# Patient Record
Sex: FEMALE | Race: WHITE | Employment: FULL TIME | URBAN - METROPOLITAN AREA
[De-identification: names, ages, dates, MRNs, and addresses within clinical notes are randomized per-mention and may not be internally consistent; named-entity substitution may affect disease eponyms.]

---

## 2017-08-02 ENCOUNTER — OFFICE VISIT (OUTPATIENT)
Dept: INTERNAL MEDICINE CLINIC | Facility: CLINIC | Age: 32
End: 2017-08-02

## 2017-08-02 VITALS
BODY MASS INDEX: 29.63 KG/M2 | WEIGHT: 188.75 LBS | HEART RATE: 77 BPM | HEIGHT: 67 IN | DIASTOLIC BLOOD PRESSURE: 76 MMHG | OXYGEN SATURATION: 99 % | TEMPERATURE: 98 F | SYSTOLIC BLOOD PRESSURE: 116 MMHG | RESPIRATION RATE: 16 BRPM

## 2017-08-02 DIAGNOSIS — H92.03 OTALGIA, BILATERAL: ICD-10-CM

## 2017-08-02 DIAGNOSIS — H61.21 RIGHT EAR IMPACTED CERUMEN: ICD-10-CM

## 2017-08-02 DIAGNOSIS — J02.9 PHARYNGITIS, UNSPECIFIED ETIOLOGY: Primary | ICD-10-CM

## 2017-08-02 LAB
CONTROL LINE PRESENT WITH A CLEAR BACKGROUND (YES/NO): YES YES/NO
KIT LOT #: NORMAL NUMERIC

## 2017-08-02 PROCEDURE — 69209 REMOVE IMPACTED EAR WAX UNI: CPT | Performed by: PHYSICIAN ASSISTANT

## 2017-08-02 PROCEDURE — 87880 STREP A ASSAY W/OPTIC: CPT | Performed by: PHYSICIAN ASSISTANT

## 2017-08-02 PROCEDURE — 99213 OFFICE O/P EST LOW 20 MIN: CPT | Performed by: PHYSICIAN ASSISTANT

## 2017-08-02 NOTE — PROGRESS NOTES
HPI:  Almaz Pedro is a 28year old female who presents for URI symptoms x 1 day. C/o sore throat, ear pain, nasal congestion, sinus pressure, fatigue. Denies fever, chills, sweats, cough, SOB, or GI symptoms.   Had a rash on the top of her R foot a couple mucosa inflamed  NECK: supple, no lymphadenopathy  LUNGS: clear to auscultation, no wheezing or rales  CARDIO: RRR, no murmur  GI: soft, non-tender, non-distended, BS present, no tenderness, no hepatosplenomegaly    ASSESSMENT AND PLAN:  # Pharyngitis: Dis

## 2017-08-02 NOTE — PATIENT INSTRUCTIONS
Sore Throat:  - may take tylenol (acetaminophen) 1,000 mg every 8 hours as needed (do not exceed 3,000 mg daily)  - may take ibuprofen 600 mg every 8 hours WITH food as needed  - salt water gargles, throat lozenges, chloraseptic spray as needed    Ear Pres

## 2017-08-12 ENCOUNTER — HOSPITAL ENCOUNTER (OUTPATIENT)
Age: 32
Discharge: HOME | End: 2017-08-12
Payer: COMMERCIAL

## 2017-08-12 VITALS
TEMPERATURE: 98 F | SYSTOLIC BLOOD PRESSURE: 129 MMHG | RESPIRATION RATE: 18 BRPM | OXYGEN SATURATION: 100 % | DIASTOLIC BLOOD PRESSURE: 77 MMHG | WEIGHT: 185 LBS | HEART RATE: 80 BPM | BODY MASS INDEX: 29.03 KG/M2 | HEIGHT: 67 IN

## 2017-08-12 DIAGNOSIS — L72.3 INFECTED SEBACEOUS CYST OF SKIN: Primary | ICD-10-CM

## 2017-08-12 DIAGNOSIS — L72.3 SEBACEOUS CYST OF EAR: ICD-10-CM

## 2017-08-12 DIAGNOSIS — L08.9 INFECTED SEBACEOUS CYST OF SKIN: Primary | ICD-10-CM

## 2017-08-12 PROCEDURE — 99204 OFFICE O/P NEW MOD 45 MIN: CPT

## 2017-08-12 PROCEDURE — 99213 OFFICE O/P EST LOW 20 MIN: CPT

## 2017-08-12 RX ORDER — CLINDAMYCIN HYDROCHLORIDE 300 MG/1
300 CAPSULE ORAL 3 TIMES DAILY
Qty: 30 CAPSULE | Refills: 0 | Status: SHIPPED | OUTPATIENT
Start: 2017-08-12 | End: 2017-08-22

## 2017-08-12 NOTE — ED PROVIDER NOTES
Patient Seen in: THE [facility]    History   Patient presents with:  Bump    Stated Complaint: CYST BEHIND EAR     HPI    Mary is a 27-year-old female who comes in stating that she had a bump behind her left ear for approximately 2 w Current:/77   Pulse 80   Temp 98.1 °F (36.7 °C) (Temporal)   Resp 18   Ht 170.2 cm (5' 7\")   Wt 83.9 kg   SpO2 100%   BMI 28.98 kg/m²         Physical Exam   Constitutional: She is oriented to person, place, and time.  She appears well-developed and ============================================================  ED Course  ------------------------------------------------------------  MDM   Patient has 1.5 cm indurated what I believe is a infected sebaceous cyst the area is extremely indurated and not Ra

## 2017-09-14 ENCOUNTER — TELEPHONE (OUTPATIENT)
Dept: INTERNAL MEDICINE CLINIC | Facility: CLINIC | Age: 32
End: 2017-09-14

## 2018-01-12 ENCOUNTER — LAB ENCOUNTER (OUTPATIENT)
Dept: LAB | Age: 33
End: 2018-01-12
Attending: NURSE PRACTITIONER
Payer: COMMERCIAL

## 2018-01-12 DIAGNOSIS — Z01.419 PAP SMEAR, LOW-RISK: Primary | ICD-10-CM

## 2018-01-12 PROCEDURE — 88175 CYTOPATH C/V AUTO FLUID REDO: CPT

## 2018-01-12 PROCEDURE — 87624 HPV HI-RISK TYP POOLED RSLT: CPT

## 2018-01-13 ENCOUNTER — HOSPITAL ENCOUNTER (OUTPATIENT)
Age: 33
Discharge: HOME | End: 2018-01-13
Attending: PEDIATRICS
Payer: COMMERCIAL

## 2018-01-13 VITALS
DIASTOLIC BLOOD PRESSURE: 84 MMHG | BODY MASS INDEX: 30.53 KG/M2 | TEMPERATURE: 99 F | HEIGHT: 66 IN | OXYGEN SATURATION: 100 % | RESPIRATION RATE: 16 BRPM | HEART RATE: 95 BPM | WEIGHT: 190 LBS | SYSTOLIC BLOOD PRESSURE: 132 MMHG

## 2018-01-13 DIAGNOSIS — J06.9 UPPER RESPIRATORY TRACT INFECTION, UNSPECIFIED TYPE: Primary | ICD-10-CM

## 2018-01-13 LAB — S PYO AG THROAT QL: NEGATIVE

## 2018-01-13 PROCEDURE — 87430 STREP A AG IA: CPT

## 2018-01-13 PROCEDURE — 99213 OFFICE O/P EST LOW 20 MIN: CPT

## 2018-01-13 PROCEDURE — 99204 OFFICE O/P NEW MOD 45 MIN: CPT

## 2018-01-13 RX ORDER — CODEINE PHOSPHATE AND GUAIFENESIN 10; 100 MG/5ML; MG/5ML
5 SOLUTION ORAL EVERY 6 HOURS PRN
Qty: 120 ML | Refills: 0 | Status: SHIPPED | OUTPATIENT
Start: 2018-01-13 | End: 2019-03-07

## 2018-01-13 NOTE — ED INITIAL ASSESSMENT (HPI)
Cold symptoms for a week. Fever and cough started last night of 100. Patient has been taking tylenol and dayquil. Patient has headaches. Denies CP and SOB.

## 2018-01-13 NOTE — ED PROVIDER NOTES
Patient presents with:  Fever (infectious)  Cough/URI  Sore Throat      HPI:     Estella Lubin is a 35year old female who presents for evaluation of a chief complaint of upper respiratory symptoms for about 4 days.   Patient states this started with a headac Supportive care    All results reviewed and discussed with patient. See AVS for detailed discharge instructions for your condition today.     Follow Up with:  Elias Manriquez MD  [address] [facility]  [phone]      As needed

## 2018-01-15 LAB — HPV I/H RISK 1 DNA SPEC QL NAA+PROBE: NEGATIVE

## 2018-01-16 ENCOUNTER — OFFICE VISIT (OUTPATIENT)
Dept: INTERNAL MEDICINE CLINIC | Facility: CLINIC | Age: 33
End: 2018-01-16

## 2018-01-16 VITALS
WEIGHT: 194 LBS | RESPIRATION RATE: 16 BRPM | BODY MASS INDEX: 31 KG/M2 | SYSTOLIC BLOOD PRESSURE: 110 MMHG | DIASTOLIC BLOOD PRESSURE: 70 MMHG | OXYGEN SATURATION: 99 % | TEMPERATURE: 99 F | HEART RATE: 77 BPM

## 2018-01-16 DIAGNOSIS — J01.10 ACUTE NON-RECURRENT FRONTAL SINUSITIS: Primary | ICD-10-CM

## 2018-01-16 PROCEDURE — 99213 OFFICE O/P EST LOW 20 MIN: CPT | Performed by: NURSE PRACTITIONER

## 2018-01-16 RX ORDER — BENZONATATE 200 MG/1
200 CAPSULE ORAL 3 TIMES DAILY PRN
Qty: 30 CAPSULE | Refills: 0 | Status: SHIPPED | OUTPATIENT
Start: 2018-01-16 | End: 2019-03-07

## 2018-01-16 RX ORDER — LEVOFLOXACIN 500 MG/1
500 TABLET, FILM COATED ORAL DAILY
Qty: 10 TABLET | Refills: 0 | Status: SHIPPED | OUTPATIENT
Start: 2018-01-16 | End: 2019-03-07

## 2018-01-16 NOTE — PROGRESS NOTES
CHIEF COMPLAINT:   Patient presents with:  Urgent Care F/u: 1/13/18 dt URI. Using Severe Cough Mucinex and alternating Tylenol and Ibuprofen.       HPI:   Isela Goldberg is a 35year old female who presents for a recheck after Urgent Care visit for diagnosis wheezing, See HPI  CARDIOVASCULAR: denies chest pain or palpitations   GI: denies N/V/C or abdominal pain  NEURO: Denies headaches    EXAM:   /70 (BP Location: Left arm, Patient Position: Sitting, Cuff Size: large)   Pulse 77   Temp 98.7 °F (37.1 °C)

## 2018-01-17 LAB — LAST PAP RESULT: NORMAL

## 2018-01-23 ENCOUNTER — TELEPHONE (OUTPATIENT)
Dept: INTERNAL MEDICINE CLINIC | Facility: CLINIC | Age: 33
End: 2018-01-23

## 2018-06-03 ENCOUNTER — HOSPITAL ENCOUNTER (OUTPATIENT)
Age: 33
Discharge: HOME | End: 2018-06-03
Attending: PEDIATRICS
Payer: COMMERCIAL

## 2018-06-03 VITALS
OXYGEN SATURATION: 100 % | BODY MASS INDEX: 31.34 KG/M2 | DIASTOLIC BLOOD PRESSURE: 78 MMHG | SYSTOLIC BLOOD PRESSURE: 113 MMHG | HEART RATE: 81 BPM | RESPIRATION RATE: 18 BRPM | HEIGHT: 66 IN | TEMPERATURE: 99 F | WEIGHT: 195 LBS
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
## 2018-06-03 PROCEDURE — 99212 OFFICE O/P EST SF 10 MIN: CPT

## 2018-06-03 NOTE — ED PROVIDER NOTES
Patient presents with:  Bite Sting,Insect (integumentary)    Stated Complaint: bug bite 8 weeks pregnant    HPI  Patient complains of bug bite for  3 days. Located R lower back. Describes as minimally itchy, not bothersome.  Possible exposures include: Ht 167.6 cm (5' 6\")   Wt 88.5 kg   LMP 01/01/2018   SpO2 100%   BMI 31.47 kg/m²       GENERAL: nad  HEENT: EOMI, MMM no lesions  EXTREMITIES: no edema  NEURO: alert, oriented x 3, 2-12 intact, no focal deficits appreciated  SKIN:  Small 0.5mm urticarial

## 2020-01-23 ENCOUNTER — OFFICE VISIT (OUTPATIENT)
Dept: INTERNAL MEDICINE CLINIC | Facility: CLINIC | Age: 35
End: 2020-01-23
Payer: COMMERCIAL

## 2020-01-23 VITALS
BODY MASS INDEX: 28.98 KG/M2 | OXYGEN SATURATION: 99 % | WEIGHT: 182.5 LBS | HEART RATE: 94 BPM | RESPIRATION RATE: 16 BRPM | TEMPERATURE: 98 F | SYSTOLIC BLOOD PRESSURE: 118 MMHG | DIASTOLIC BLOOD PRESSURE: 68 MMHG | HEIGHT: 66.5 IN

## 2020-01-23 DIAGNOSIS — J06.9 VIRAL UPPER RESPIRATORY TRACT INFECTION: Primary | ICD-10-CM

## 2020-01-23 DIAGNOSIS — R82.998 SWEET URINE ODOR: ICD-10-CM

## 2020-01-23 DIAGNOSIS — Z00.00 LABORATORY EXAMINATION ORDERED AS PART OF A COMPLETE PHYSICAL EXAMINATION: ICD-10-CM

## 2020-01-23 DIAGNOSIS — Z83.3 FAMILY HISTORY OF DIABETES MELLITUS (DM): ICD-10-CM

## 2020-01-23 PROCEDURE — 99214 OFFICE O/P EST MOD 30 MIN: CPT | Performed by: NURSE PRACTITIONER

## 2020-01-23 NOTE — PROGRESS NOTES
CHIEF COMPLAINT:   Patient presents with:  Sinus Problem      HPI:   Reina Gomes is a 28year old female who presents for upper respiratory symptoms for  3 days. Patient reports PND, nasal congestion, feeling hot but no fevers. Denies cough.  Symptoms have intact  EARS: TM's yellow, no bulging, no retraction, no fluid, bony landmarks visible  NOSE: Nostrils patent, yellow nasal discharge, nasal mucosa mildly erythematous  THROAT: Oral mucosa pink, moist. Posterior pharynx is not erythematous. + PND exudates.

## 2020-01-23 NOTE — PATIENT INSTRUCTIONS
Viral Upper Respiratory Illness (Adult)    You have a viral upper respiratory illness (URI), which is another term for the common cold. This illness is contagious during the first few days. It is spread through the air by coughing and sneezing.  It may al · Over-the-counter cold medicines will not shorten the length of time you’re sick, but they may be helpful for the following symptoms: cough, sore throat, and nasal and sinus congestion.  If you take prescription medicines, ask your healthcare provider or p An inflamed and irritated stomach lining is more likely to develop a sore called an ulcer. To help prevent this, gastritis should be treated. Home care  If needed, our healthcare provider may prescribe medicines.  If you have H pylori infection, treating i

## 2020-09-23 ENCOUNTER — APPOINTMENT (OUTPATIENT)
Dept: LAB | Age: 35
End: 2020-09-23
Attending: FAMILY MEDICINE
Payer: COMMERCIAL

## 2020-09-23 ENCOUNTER — TELEPHONE (OUTPATIENT)
Dept: INTERNAL MEDICINE CLINIC | Facility: CLINIC | Age: 35
End: 2020-09-23

## 2020-09-23 DIAGNOSIS — R19.7 DIARRHEA, UNSPECIFIED TYPE: ICD-10-CM

## 2020-09-23 DIAGNOSIS — R19.7 DIARRHEA, UNSPECIFIED TYPE: Primary | ICD-10-CM

## 2020-09-23 DIAGNOSIS — R51.9 ACUTE NONINTRACTABLE HEADACHE, UNSPECIFIED HEADACHE TYPE: ICD-10-CM

## 2020-09-23 NOTE — TELEPHONE ENCOUNTER
Spoke with patient notified COVID test ordered, patient will call CS to schedule appt. Patient verbalized understanding and agreeable to POC.

## 2020-09-23 NOTE — TELEPHONE ENCOUNTER
Spoke with patient stating earlier this week started having headache and diarrhea, feels fatigue today no fevers, no chills, no nausea, no vomiting, no abdominal pain, no sore throat, no CP, no SOB or any other symptom at this time.  Patient was not exposed

## 2020-09-23 NOTE — TELEPHONE ENCOUNTER
Pt would like a to request an order for Covid says she has diarrhea and headache and her job would like her to get tested

## 2020-09-26 LAB — SARS-COV-2 RNA RESP QL NAA+PROBE: NOT DETECTED

## 2021-04-13 ENCOUNTER — TELEPHONE (OUTPATIENT)
Dept: INTERNAL MEDICINE CLINIC | Facility: CLINIC | Age: 36
End: 2021-04-13

## 2021-04-13 NOTE — TELEPHONE ENCOUNTER
Patient made an appt via [email or web address] for a CPX with Dr. Roddy Peters but she has not been seen since 2015? Ok to keep?

## 2021-04-23 ENCOUNTER — OFFICE VISIT (OUTPATIENT)
Dept: INTERNAL MEDICINE CLINIC | Facility: CLINIC | Age: 36
End: 2021-04-23
Payer: COMMERCIAL

## 2021-04-23 VITALS
RESPIRATION RATE: 12 BRPM | DIASTOLIC BLOOD PRESSURE: 48 MMHG | HEIGHT: 66.75 IN | TEMPERATURE: 99 F | SYSTOLIC BLOOD PRESSURE: 107 MMHG | OXYGEN SATURATION: 100 % | HEART RATE: 78 BPM | WEIGHT: 193.5 LBS | BODY MASS INDEX: 30.37 KG/M2

## 2021-04-23 DIAGNOSIS — Z00.00 WELLNESS EXAMINATION: Primary | ICD-10-CM

## 2021-04-23 DIAGNOSIS — Z00.00 LABORATORY EXAMINATION ORDERED AS PART OF A COMPLETE PHYSICAL EXAMINATION: ICD-10-CM

## 2021-04-23 PROCEDURE — 3078F DIAST BP <80 MM HG: CPT | Performed by: FAMILY MEDICINE

## 2021-04-23 PROCEDURE — 3074F SYST BP LT 130 MM HG: CPT | Performed by: FAMILY MEDICINE

## 2021-04-23 PROCEDURE — 99395 PREV VISIT EST AGE 18-39: CPT | Performed by: FAMILY MEDICINE

## 2021-04-23 PROCEDURE — 3008F BODY MASS INDEX DOCD: CPT | Performed by: FAMILY MEDICINE

## 2021-04-23 NOTE — PROGRESS NOTES
HPI:   Severo Mina is a 39year old female who presents for a complete physical exam.     Wt Readings from Last 6 Encounters:  04/23/21 : 193 lb 8 oz (87.8 kg)  01/23/20 : 182 lb 8 oz (82.8 kg)  03/07/19 : 179 lb (81.2 kg)  06/03/18 : 195 lb (88.5 kg)  01/ Ht 5' 6.75\" (1.695 m)   Wt 193 lb 8 oz (87.8 kg)   LMP 03/20/2021 (Approximate)   SpO2 100%   Breastfeeding No   BMI 30.53 kg/m²   Body mass index is 30.53 kg/m².    GENERAL: well developed, well nourished,in no apparent distress  SKIN: no rashes   NECK: s

## 2021-04-27 ENCOUNTER — LABORATORY ENCOUNTER (OUTPATIENT)
Dept: LAB | Age: 36
End: 2021-04-27
Attending: FAMILY MEDICINE
Payer: COMMERCIAL

## 2021-04-27 ENCOUNTER — PATIENT MESSAGE (OUTPATIENT)
Dept: INTERNAL MEDICINE CLINIC | Facility: CLINIC | Age: 36
End: 2021-04-27

## 2021-04-27 DIAGNOSIS — R31.9 HEMATURIA, UNSPECIFIED TYPE: ICD-10-CM

## 2021-04-27 DIAGNOSIS — E55.9 VITAMIN D DEFICIENCY: Primary | ICD-10-CM

## 2021-04-27 DIAGNOSIS — Z00.00 LABORATORY EXAMINATION ORDERED AS PART OF A COMPLETE PHYSICAL EXAMINATION: ICD-10-CM

## 2021-04-27 DIAGNOSIS — Z00.00 WELLNESS EXAMINATION: ICD-10-CM

## 2021-04-27 DIAGNOSIS — D64.9 ANEMIA, UNSPECIFIED TYPE: ICD-10-CM

## 2021-04-27 PROCEDURE — 80050 GENERAL HEALTH PANEL: CPT | Performed by: FAMILY MEDICINE

## 2021-04-27 PROCEDURE — 82306 VITAMIN D 25 HYDROXY: CPT | Performed by: FAMILY MEDICINE

## 2021-04-27 PROCEDURE — 81001 URINALYSIS AUTO W/SCOPE: CPT | Performed by: FAMILY MEDICINE

## 2021-04-27 PROCEDURE — 80061 LIPID PANEL: CPT | Performed by: FAMILY MEDICINE

## 2021-04-28 RX ORDER — ERGOCALCIFEROL 1.25 MG/1
50000 CAPSULE ORAL WEEKLY
Qty: 4 CAPSULE | Refills: 5 | Status: SHIPPED | OUTPATIENT
Start: 2021-04-28 | End: 2021-10-03 | Stop reason: ALTCHOICE

## 2021-04-28 NOTE — TELEPHONE ENCOUNTER
Samantha Hicks MD   4/27/2021  7:35 PM CDT Back to Top      UA looks like UTI  any s/s?   Lipids OK  thryoid OK  Low Vit D   rec vit D 40728E weekly #6 5r  luisito 6 mo  Very slight anemia   suspect d/t menses    rec daily MVI w iron     Luisito CBC in 3 mo

## 2021-04-28 NOTE — TELEPHONE ENCOUNTER
From: Chani Dear  To: Lucy Forbes MD  Sent: 4/27/2021 4:14 PM CDT  Subject: Test Results Question    Hi! I just had a physical last week with Dr. Torres Billings, and I received my test results today from my blood/urinalysis tests in [facility].  I just wanted to abram

## 2021-05-04 ENCOUNTER — LAB ENCOUNTER (OUTPATIENT)
Dept: LAB | Age: 36
End: 2021-05-04
Attending: FAMILY MEDICINE
Payer: COMMERCIAL

## 2021-05-04 DIAGNOSIS — R31.9 HEMATURIA, UNSPECIFIED TYPE: ICD-10-CM

## 2021-05-04 PROCEDURE — 87086 URINE CULTURE/COLONY COUNT: CPT | Performed by: FAMILY MEDICINE

## 2021-05-04 PROCEDURE — 87186 SC STD MICRODIL/AGAR DIL: CPT | Performed by: FAMILY MEDICINE

## 2021-05-04 PROCEDURE — 81001 URINALYSIS AUTO W/SCOPE: CPT | Performed by: FAMILY MEDICINE

## 2021-05-04 PROCEDURE — 87088 URINE BACTERIA CULTURE: CPT | Performed by: FAMILY MEDICINE

## 2021-05-07 ENCOUNTER — TELEPHONE (OUTPATIENT)
Dept: INTERNAL MEDICINE CLINIC | Facility: CLINIC | Age: 36
End: 2021-05-07

## 2021-05-07 RX ORDER — NITROFURANTOIN 25; 75 MG/1; MG/1
100 CAPSULE ORAL 2 TIMES DAILY
Qty: 14 CAPSULE | Refills: 0 | Status: SHIPPED | OUTPATIENT
Start: 2021-05-07 | End: 2021-05-14

## 2021-05-07 NOTE — TELEPHONE ENCOUNTER
----- Message from Yas Sullivan MD sent at 5/7/2021 11:02 AM CDT -----  +UTI   rec macrobid BID for 7 days  #14

## 2021-05-09 ENCOUNTER — HOSPITAL ENCOUNTER (OUTPATIENT)
Age: 36
Discharge: HOME | End: 2021-05-09
Attending: EMERGENCY MEDICINE
Payer: COMMERCIAL

## 2021-05-09 VITALS
DIASTOLIC BLOOD PRESSURE: 84 MMHG | HEART RATE: 82 BPM | BODY MASS INDEX: 30.53 KG/M2 | WEIGHT: 190 LBS | SYSTOLIC BLOOD PRESSURE: 109 MMHG | HEIGHT: 66 IN | RESPIRATION RATE: 20 BRPM | OXYGEN SATURATION: 100 % | TEMPERATURE: 98 F

## 2021-05-09 DIAGNOSIS — N30.00 ACUTE CYSTITIS WITHOUT HEMATURIA: ICD-10-CM

## 2021-05-09 DIAGNOSIS — L73.9 FOLLICULITIS: Primary | ICD-10-CM

## 2021-05-09 DIAGNOSIS — H00.033 EYELID CELLULITIS, RIGHT: ICD-10-CM

## 2021-05-09 PROCEDURE — 10060 I&D ABSCESS SIMPLE/SINGLE: CPT

## 2021-05-09 PROCEDURE — 99213 OFFICE O/P EST LOW 20 MIN: CPT

## 2021-05-09 PROCEDURE — 99203 OFFICE O/P NEW LOW 30 MIN: CPT

## 2021-05-09 PROCEDURE — 089NXZZ DRAINAGE OF RIGHT UPPER EYELID, EXTERNAL APPROACH: ICD-10-PCS | Performed by: EMERGENCY MEDICINE

## 2021-05-09 RX ORDER — CEPHALEXIN 500 MG/1
500 CAPSULE ORAL 4 TIMES DAILY
Qty: 28 CAPSULE | Refills: 0 | Status: SHIPPED | OUTPATIENT
Start: 2021-05-09 | End: 2021-05-16

## 2021-05-09 NOTE — ED PROVIDER NOTES
Patient Seen in: [facility]      History   Patient presents with:  Wound Care: Not sure why,  but my eye is painful and very puffy since yesterday abs is worse today. Can't open all the way.   Just started a uti antibiotic that makes me Temporal   SpO2 100 %   O2 Device None (Room air)       Current:/84   Pulse 82   Temp 98.3 °F (36.8 °C) (Temporal)   Resp 20   Ht 167.6 cm (5' 6\")   Wt 86.2 kg   LMP 04/30/2021 (Approximate)   SpO2 100%   BMI 30.67 kg/m²     Right Eye Chart Acuity: R-0

## 2021-05-09 NOTE — ED INITIAL ASSESSMENT (HPI)
C/o right eye painful, eyelid red, swollen since yesterday and is worse today. Unable to open all the way. Currently on antibiotic for UTI and having nausea for 3 days.

## 2021-10-03 ENCOUNTER — HOSPITAL ENCOUNTER (OUTPATIENT)
Age: 36
Discharge: HOME | End: 2021-10-03
Payer: COMMERCIAL

## 2021-10-03 VITALS
RESPIRATION RATE: 18 BRPM | WEIGHT: 190 LBS | DIASTOLIC BLOOD PRESSURE: 73 MMHG | BODY MASS INDEX: 29.82 KG/M2 | HEART RATE: 74 BPM | HEIGHT: 67 IN | OXYGEN SATURATION: 100 % | TEMPERATURE: 97 F | SYSTOLIC BLOOD PRESSURE: 118 MMHG

## 2021-10-03 DIAGNOSIS — H18.891 CORNEAL IRRITATION OF RIGHT EYE: Primary | ICD-10-CM

## 2021-10-03 PROCEDURE — 90471 IMMUNIZATION ADMIN: CPT

## 2021-10-03 PROCEDURE — 99213 OFFICE O/P EST LOW 20 MIN: CPT

## 2021-10-03 RX ORDER — TOBRAMYCIN 3 MG/ML
2 SOLUTION/ DROPS OPHTHALMIC EVERY 6 HOURS
Qty: 5 ML | Refills: 0 | Status: SHIPPED | OUTPATIENT
Start: 2021-10-03 | End: 2021-10-10

## 2021-10-03 NOTE — ED PROVIDER NOTES
Patient Seen in: [facility] [place]      History   Patient presents with:  Eye Problem    Stated Complaint: Eye Irritation    Subjective:   HPI  Patient is 60-year-old female without significant medical history presents with right eye discomfort not ill-appearing, toxic-appearing or diaphoretic. HENT:      Mouth/Throat:      Mouth: Mucous membranes are moist.   Eyes:      General: No scleral icterus. Right eye: Discharge (Watery) present. Left eye: No discharge.       Extraocular M

## 2021-10-03 NOTE — ED INITIAL ASSESSMENT (HPI)
Pt presents today with c/o right eye pain that started in the middle of the night. Pt denies any injury to the eye. Pt does not wear contact lenses.

## 2022-01-22 ENCOUNTER — TELEPHONE (OUTPATIENT)
Dept: INTERNAL MEDICINE CLINIC | Facility: CLINIC | Age: 37
End: 2022-01-22

## 2022-01-22 NOTE — TELEPHONE ENCOUNTER
Incoming fax from [facility]    Patients H&P from visit on 1/21/2022    Placed in  in basket for review

## 2022-01-25 ENCOUNTER — TELEPHONE (OUTPATIENT)
Dept: INTERNAL MEDICINE CLINIC | Facility: CLINIC | Age: 37
End: 2022-01-25

## 2022-01-25 LAB — PREGNANCY TEST, URINE: NEGATIVE

## 2022-01-27 ENCOUNTER — TELEPHONE (OUTPATIENT)
Dept: INTERNAL MEDICINE CLINIC | Facility: CLINIC | Age: 37
End: 2022-01-27

## 2022-01-27 NOTE — TELEPHONE ENCOUNTER
Incoming fax from [facility]    Patients Operative report from 1/21/2022    Patient had Excision of vulvar mass     Placed in  in basket for review

## 2022-04-13 ENCOUNTER — TELEMEDICINE (OUTPATIENT)
Dept: INTERNAL MEDICINE CLINIC | Facility: CLINIC | Age: 37
End: 2022-04-13
Payer: COMMERCIAL

## 2022-04-13 DIAGNOSIS — J01.90 ACUTE NON-RECURRENT SINUSITIS, UNSPECIFIED LOCATION: Primary | ICD-10-CM

## 2022-04-13 PROCEDURE — 99213 OFFICE O/P EST LOW 20 MIN: CPT | Performed by: FAMILY MEDICINE

## 2022-04-13 RX ORDER — DOXYCYCLINE HYCLATE 100 MG/1
100 CAPSULE ORAL 2 TIMES DAILY
Qty: 20 CAPSULE | Refills: 0 | Status: SHIPPED | OUTPATIENT
Start: 2022-04-13

## 2022-07-24 ENCOUNTER — PATIENT MESSAGE (OUTPATIENT)
Dept: INTERNAL MEDICINE CLINIC | Facility: CLINIC | Age: 37
End: 2022-07-24

## 2022-07-25 ENCOUNTER — OFFICE VISIT (OUTPATIENT)
Dept: INTERNAL MEDICINE CLINIC | Facility: CLINIC | Age: 37
End: 2022-07-25
Payer: COMMERCIAL

## 2022-07-25 ENCOUNTER — TELEPHONE (OUTPATIENT)
Dept: INTERNAL MEDICINE CLINIC | Facility: CLINIC | Age: 37
End: 2022-07-25

## 2022-07-25 ENCOUNTER — HOSPITAL ENCOUNTER (OUTPATIENT)
Dept: GENERAL RADIOLOGY | Age: 37
Discharge: HOME | End: 2022-07-25
Attending: FAMILY MEDICINE
Payer: COMMERCIAL

## 2022-07-25 VITALS
BODY MASS INDEX: 30.95 KG/M2 | WEIGHT: 197.19 LBS | DIASTOLIC BLOOD PRESSURE: 80 MMHG | SYSTOLIC BLOOD PRESSURE: 120 MMHG | OXYGEN SATURATION: 98 % | HEART RATE: 86 BPM | HEIGHT: 67 IN | TEMPERATURE: 99 F

## 2022-07-25 DIAGNOSIS — H66.92 ACUTE LEFT OTITIS MEDIA: ICD-10-CM

## 2022-07-25 DIAGNOSIS — M79.674 PAIN OF TOE OF RIGHT FOOT: ICD-10-CM

## 2022-07-25 DIAGNOSIS — M79.674 PAIN OF TOE OF RIGHT FOOT: Primary | ICD-10-CM

## 2022-07-25 PROCEDURE — 3074F SYST BP LT 130 MM HG: CPT | Performed by: FAMILY MEDICINE

## 2022-07-25 PROCEDURE — 3079F DIAST BP 80-89 MM HG: CPT | Performed by: FAMILY MEDICINE

## 2022-07-25 PROCEDURE — 99213 OFFICE O/P EST LOW 20 MIN: CPT | Performed by: FAMILY MEDICINE

## 2022-07-25 PROCEDURE — 3008F BODY MASS INDEX DOCD: CPT | Performed by: FAMILY MEDICINE

## 2022-07-25 PROCEDURE — 73660 X-RAY EXAM OF TOE(S): CPT | Performed by: FAMILY MEDICINE

## 2022-07-25 RX ORDER — CEFDINIR 300 MG/1
300 CAPSULE ORAL 2 TIMES DAILY
Qty: 20 CAPSULE | Refills: 0 | Status: SHIPPED | OUTPATIENT
Start: 2022-07-25

## 2022-07-25 NOTE — TELEPHONE ENCOUNTER
Please let Pt know.  Pt to continue to shobha tape her toe to the 4th toe and ice the toe and  take some Ibuprofen or ASA for the pain

## 2022-07-25 NOTE — TELEPHONE ENCOUNTER
From: Curtis Ram  To: JIMMIE Zhang  Sent: 7/24/2022 6:54 PM CDT  Subject: Additional concern at [facility]    Hi-- tomorrow at 930 I am seeing [facility] for my ear. Can I possibly add on her checking out my pinky toe? On Sat., I stubbed it yesterday and just went about my day, but the bruising, swelling and pain are worse today and i can't really walk on that toe. I can't move it without intense pain.

## 2022-12-12 ENCOUNTER — TELEMEDICINE (OUTPATIENT)
Dept: INTERNAL MEDICINE CLINIC | Facility: CLINIC | Age: 37
End: 2022-12-12

## 2022-12-12 DIAGNOSIS — J01.90 ACUTE NON-RECURRENT SINUSITIS, UNSPECIFIED LOCATION: Primary | ICD-10-CM

## 2022-12-12 DIAGNOSIS — R05.1 ACUTE COUGH: ICD-10-CM

## 2022-12-12 RX ORDER — CEFDINIR 300 MG/1
300 CAPSULE ORAL 2 TIMES DAILY
Qty: 20 CAPSULE | Refills: 0 | Status: SHIPPED | OUTPATIENT
Start: 2022-12-12

## 2022-12-16 ENCOUNTER — HOSPITAL ENCOUNTER (OUTPATIENT)
Age: 37
Discharge: HOME | End: 2022-12-16
Payer: COMMERCIAL

## 2022-12-16 VITALS
HEART RATE: 78 BPM | BODY MASS INDEX: 30.53 KG/M2 | SYSTOLIC BLOOD PRESSURE: 140 MMHG | OXYGEN SATURATION: 98 % | RESPIRATION RATE: 18 BRPM | TEMPERATURE: 99 F | DIASTOLIC BLOOD PRESSURE: 84 MMHG | WEIGHT: 190 LBS | HEIGHT: 66 IN

## 2022-12-16 DIAGNOSIS — U07.1 COVID-19: Primary | ICD-10-CM

## 2022-12-16 LAB
POCT INFLUENZA A: NEGATIVE
POCT INFLUENZA B: NEGATIVE
SARS-COV-2 RNA RESP QL NAA+PROBE: DETECTED

## 2022-12-16 PROCEDURE — 99213 OFFICE O/P EST LOW 20 MIN: CPT

## 2022-12-16 PROCEDURE — 87502 INFLUENZA DNA AMP PROBE: CPT | Performed by: NURSE PRACTITIONER

## 2022-12-16 NOTE — ED INITIAL ASSESSMENT (HPI)
Pt aox4. Pt c/o cough, fatigue and hoarse voice started Friday. Pt had a telehealth visit on Monday and given Cefdnir. Pt states cough has become worse and started with headache yesterday. Pt states feels worse.

## 2023-01-13 ENCOUNTER — OFFICE VISIT (OUTPATIENT)
Dept: INTERNAL MEDICINE CLINIC | Facility: CLINIC | Age: 38
End: 2023-01-13
Payer: COMMERCIAL

## 2023-01-13 VITALS
OXYGEN SATURATION: 97 % | HEART RATE: 96 BPM | TEMPERATURE: 99 F | DIASTOLIC BLOOD PRESSURE: 78 MMHG | SYSTOLIC BLOOD PRESSURE: 112 MMHG | BODY MASS INDEX: 31.53 KG/M2 | HEIGHT: 66 IN | WEIGHT: 196.19 LBS

## 2023-01-13 DIAGNOSIS — Z00.00 LABORATORY EXAM ORDERED AS PART OF ROUTINE GENERAL MEDICAL EXAMINATION: ICD-10-CM

## 2023-01-13 DIAGNOSIS — Z00.00 WELLNESS EXAMINATION: Primary | ICD-10-CM

## 2023-01-13 PROCEDURE — 3074F SYST BP LT 130 MM HG: CPT | Performed by: FAMILY MEDICINE

## 2023-01-13 PROCEDURE — 3078F DIAST BP <80 MM HG: CPT | Performed by: FAMILY MEDICINE

## 2023-01-13 PROCEDURE — 3008F BODY MASS INDEX DOCD: CPT | Performed by: FAMILY MEDICINE

## 2023-01-13 PROCEDURE — 99395 PREV VISIT EST AGE 18-39: CPT | Performed by: FAMILY MEDICINE

## 2023-01-13 RX ORDER — FLUTICASONE PROPIONATE 50 MCG
1 SPRAY, SUSPENSION (ML) NASAL 2 TIMES DAILY
COMMUNITY
Start: 2022-09-13

## 2023-02-14 ENCOUNTER — HOSPITAL ENCOUNTER (OUTPATIENT)
Age: 38
Discharge: HOME | End: 2023-02-14
Payer: COMMERCIAL

## 2023-02-14 VITALS
SYSTOLIC BLOOD PRESSURE: 112 MMHG | RESPIRATION RATE: 16 BRPM | OXYGEN SATURATION: 99 % | HEART RATE: 76 BPM | TEMPERATURE: 98 F | DIASTOLIC BLOOD PRESSURE: 66 MMHG

## 2023-02-14 DIAGNOSIS — J40 BRONCHITIS: ICD-10-CM

## 2023-02-14 DIAGNOSIS — H66.92 LEFT OTITIS MEDIA, UNSPECIFIED OTITIS MEDIA TYPE: Primary | ICD-10-CM

## 2023-02-14 PROCEDURE — 99213 OFFICE O/P EST LOW 20 MIN: CPT

## 2023-02-14 RX ORDER — DOXYCYCLINE HYCLATE 100 MG/1
100 CAPSULE ORAL 2 TIMES DAILY
Qty: 14 CAPSULE | Refills: 0 | Status: SHIPPED | OUTPATIENT
Start: 2023-02-14 | End: 2023-02-21

## 2023-02-14 RX ORDER — ALBUTEROL SULFATE 90 UG/1
2 AEROSOL, METERED RESPIRATORY (INHALATION) EVERY 4 HOURS PRN
Qty: 1 EACH | Refills: 0 | Status: SHIPPED | OUTPATIENT
Start: 2023-02-14 | End: 2023-03-16

## 2023-02-14 RX ORDER — PREDNISONE 20 MG/1
40 TABLET ORAL DAILY
Qty: 10 TABLET | Refills: 0 | Status: SHIPPED | OUTPATIENT
Start: 2023-02-14 | End: 2023-02-19

## 2023-02-14 RX ORDER — BENZONATATE 200 MG/1
200 CAPSULE ORAL 3 TIMES DAILY PRN
Qty: 30 CAPSULE | Refills: 0 | Status: SHIPPED | OUTPATIENT
Start: 2023-02-14 | End: 2023-02-24

## 2023-02-15 NOTE — DISCHARGE INSTRUCTIONS
Take medications as directed. Follow-up with your primary care doctor. Tylenol/Motrin as needed for pain. Return with any new or worsening symptoms.

## 2023-02-15 NOTE — ED INITIAL ASSESSMENT (HPI)
x1 wk Pt c/o cough, chest congestion getting worse, fatigue, chills    Denies: recent fevers    Fri Pt went to a [facility] diagnosed with viral illness. +COVID in December.

## 2023-02-16 ENCOUNTER — APPOINTMENT (OUTPATIENT)
Dept: GENERAL RADIOLOGY | Age: 38
End: 2023-02-16
Attending: EMERGENCY MEDICINE
Payer: COMMERCIAL

## 2023-02-16 ENCOUNTER — HOSPITAL ENCOUNTER (OUTPATIENT)
Age: 38
Discharge: HOME | End: 2023-02-16
Attending: EMERGENCY MEDICINE
Payer: COMMERCIAL

## 2023-02-16 ENCOUNTER — TELEPHONE (OUTPATIENT)
Dept: INTERNAL MEDICINE CLINIC | Facility: CLINIC | Age: 38
End: 2023-02-16

## 2023-02-16 VITALS
RESPIRATION RATE: 18 BRPM | HEART RATE: 88 BPM | DIASTOLIC BLOOD PRESSURE: 70 MMHG | SYSTOLIC BLOOD PRESSURE: 135 MMHG | OXYGEN SATURATION: 99 % | TEMPERATURE: 98 F

## 2023-02-16 DIAGNOSIS — J06.9 VIRAL UPPER RESPIRATORY TRACT INFECTION: Primary | ICD-10-CM

## 2023-02-16 LAB — SARS-COV-2 RNA RESP QL NAA+PROBE: NOT DETECTED

## 2023-02-16 PROCEDURE — 71046 X-RAY EXAM CHEST 2 VIEWS: CPT | Performed by: EMERGENCY MEDICINE

## 2023-02-16 PROCEDURE — 99214 OFFICE O/P EST MOD 30 MIN: CPT

## 2023-02-16 NOTE — TELEPHONE ENCOUNTER
Please cancel appt for today. Spoke to patient, she states she was unable to sleep last night. Still feels like someone is sitting on her chest. Since starting the medication prescribed at  she feels worse. She now has ear pain she didn't have before despite being told she had an ear infection. She is taking shallow breaths because she can't take a deep breath without coughing. She overall feels worse. Audible dry cough noted during conversation. Patient was advised to proceed to UC or ER for treatment as she may need further testing that can't be performed in office. Patient agreeable.

## 2023-02-16 NOTE — ED INITIAL ASSESSMENT (HPI)
Patient presents to IC with continued c/o cough and congestion-states not improved from 2 days ago. No fever.

## 2023-02-16 NOTE — DISCHARGE INSTRUCTIONS
Continue to rest, supportive care, multivitamins, lots of fluids.   Follow-up with your primary care doctor as needed

## 2023-03-22 ENCOUNTER — OFFICE VISIT (OUTPATIENT)
Dept: INTERNAL MEDICINE CLINIC | Facility: CLINIC | Age: 38
End: 2023-03-22
Payer: COMMERCIAL

## 2023-03-22 VITALS
WEIGHT: 191.38 LBS | SYSTOLIC BLOOD PRESSURE: 110 MMHG | TEMPERATURE: 99 F | HEIGHT: 66 IN | DIASTOLIC BLOOD PRESSURE: 76 MMHG | HEART RATE: 75 BPM | OXYGEN SATURATION: 99 % | BODY MASS INDEX: 30.76 KG/M2

## 2023-03-22 DIAGNOSIS — K52.9 GASTROENTERITIS: Primary | ICD-10-CM

## 2023-03-22 PROCEDURE — 3078F DIAST BP <80 MM HG: CPT | Performed by: FAMILY MEDICINE

## 2023-03-22 PROCEDURE — 99213 OFFICE O/P EST LOW 20 MIN: CPT | Performed by: FAMILY MEDICINE

## 2023-03-22 PROCEDURE — 3074F SYST BP LT 130 MM HG: CPT | Performed by: FAMILY MEDICINE

## 2023-03-22 PROCEDURE — 3008F BODY MASS INDEX DOCD: CPT | Performed by: FAMILY MEDICINE

## 2023-08-03 ENCOUNTER — LAB ENCOUNTER (OUTPATIENT)
Dept: LAB | Age: 38
End: 2023-08-03
Attending: FAMILY MEDICINE
Payer: COMMERCIAL

## 2023-08-03 DIAGNOSIS — Z00.00 WELLNESS EXAMINATION: ICD-10-CM

## 2023-08-03 DIAGNOSIS — Z00.00 LABORATORY EXAM ORDERED AS PART OF ROUTINE GENERAL MEDICAL EXAMINATION: ICD-10-CM

## 2023-08-03 LAB
ALBUMIN SERPL-MCNC: 3.8 G/DL (ref 3.4–5)
ALBUMIN/GLOB SERPL: 1.1 {RATIO} (ref 1–2)
ALP LIVER SERPL-CCNC: 56 U/L
ALT SERPL-CCNC: 26 U/L
ANION GAP SERPL CALC-SCNC: 4 MMOL/L (ref 0–18)
AST SERPL-CCNC: 17 U/L (ref 15–37)
BASOPHILS # BLD AUTO: 0.04 X10(3) UL (ref 0–0.2)
BASOPHILS NFR BLD AUTO: 0.6 %
BILIRUB SERPL-MCNC: 0.5 MG/DL (ref 0.1–2)
BILIRUB UR QL STRIP.AUTO: NEGATIVE
BUN BLD-MCNC: 9 MG/DL (ref 7–18)
CALCIUM BLD-MCNC: 8.9 MG/DL (ref 8.5–10.1)
CHLORIDE SERPL-SCNC: 108 MMOL/L (ref 98–112)
CHOLEST SERPL-MCNC: 141 MG/DL (ref ?–200)
CLARITY UR REFRACT.AUTO: CLEAR
CO2 SERPL-SCNC: 27 MMOL/L (ref 21–32)
CREAT BLD-MCNC: 0.65 MG/DL
EGFRCR SERPLBLD CKD-EPI 2021: 116 ML/MIN/1.73M2 (ref 60–?)
EOSINOPHIL # BLD AUTO: 0.31 X10(3) UL (ref 0–0.7)
EOSINOPHIL NFR BLD AUTO: 4.5 %
ERYTHROCYTE [DISTWIDTH] IN BLOOD BY AUTOMATED COUNT: 12.5 %
FASTING PATIENT LIPID ANSWER: YES
FASTING STATUS PATIENT QL REPORTED: YES
GLOBULIN PLAS-MCNC: 3.4 G/DL (ref 2.8–4.4)
GLUCOSE BLD-MCNC: 90 MG/DL (ref 70–99)
GLUCOSE UR STRIP.AUTO-MCNC: NEGATIVE MG/DL
HCT VFR BLD AUTO: 41.4 %
HDLC SERPL-MCNC: 38 MG/DL (ref 40–59)
HGB BLD-MCNC: 13 G/DL
IMM GRANULOCYTES # BLD AUTO: 0.02 X10(3) UL (ref 0–1)
IMM GRANULOCYTES NFR BLD: 0.3 %
KETONES UR STRIP.AUTO-MCNC: NEGATIVE MG/DL
LDLC SERPL CALC-MCNC: 79 MG/DL (ref ?–100)
LYMPHOCYTES # BLD AUTO: 1.48 X10(3) UL (ref 1–4)
LYMPHOCYTES NFR BLD AUTO: 21.3 %
MCH RBC QN AUTO: 28.2 PG (ref 26–34)
MCHC RBC AUTO-ENTMCNC: 31.4 G/DL (ref 31–37)
MCV RBC AUTO: 89.8 FL
MONOCYTES # BLD AUTO: 0.54 X10(3) UL (ref 0.1–1)
MONOCYTES NFR BLD AUTO: 7.8 %
NEUTROPHILS # BLD AUTO: 4.57 X10 (3) UL (ref 1.5–7.7)
NEUTROPHILS # BLD AUTO: 4.57 X10(3) UL (ref 1.5–7.7)
NEUTROPHILS NFR BLD AUTO: 65.5 %
NITRITE UR QL STRIP.AUTO: NEGATIVE
NONHDLC SERPL-MCNC: 103 MG/DL (ref ?–130)
OSMOLALITY SERPL CALC.SUM OF ELEC: 286 MOSM/KG (ref 275–295)
PH UR STRIP.AUTO: 7 [PH] (ref 5–8)
PLATELET # BLD AUTO: 270 10(3)UL (ref 150–450)
POTASSIUM SERPL-SCNC: 4 MMOL/L (ref 3.5–5.1)
PROT SERPL-MCNC: 7.2 G/DL (ref 6.4–8.2)
PROT UR STRIP.AUTO-MCNC: NEGATIVE MG/DL
RBC # BLD AUTO: 4.61 X10(6)UL
RBC UR QL AUTO: NEGATIVE
SODIUM SERPL-SCNC: 139 MMOL/L (ref 136–145)
SP GR UR STRIP.AUTO: 1 (ref 1–1.03)
TRIGL SERPL-MCNC: 138 MG/DL (ref 30–149)
TSI SER-ACNC: 0.76 MIU/ML (ref 0.36–3.74)
UROBILINOGEN UR STRIP.AUTO-MCNC: <2 MG/DL
VIT D+METAB SERPL-MCNC: 17.8 NG/ML (ref 30–100)
VLDLC SERPL CALC-MCNC: 22 MG/DL (ref 0–30)
WBC # BLD AUTO: 7 X10(3) UL (ref 4–11)

## 2023-08-03 PROCEDURE — 81001 URINALYSIS AUTO W/SCOPE: CPT | Performed by: FAMILY MEDICINE

## 2023-08-03 PROCEDURE — 80061 LIPID PANEL: CPT | Performed by: FAMILY MEDICINE

## 2023-08-03 PROCEDURE — 80050 GENERAL HEALTH PANEL: CPT | Performed by: FAMILY MEDICINE

## 2023-08-03 PROCEDURE — 82306 VITAMIN D 25 HYDROXY: CPT | Performed by: FAMILY MEDICINE

## 2023-08-07 DIAGNOSIS — E55.9 VITAMIN D DEFICIENCY: Primary | ICD-10-CM

## 2023-08-07 RX ORDER — ERGOCALCIFEROL 1.25 MG/1
50000 CAPSULE ORAL WEEKLY
Qty: 12 CAPSULE | Refills: 1 | Status: SHIPPED | OUTPATIENT
Start: 2023-08-07 | End: 2024-01-16

## 2023-10-25 ENCOUNTER — HOSPITAL ENCOUNTER (OUTPATIENT)
Age: 38
Discharge: HOME | End: 2023-10-25
Payer: COMMERCIAL

## 2023-10-25 ENCOUNTER — TELEPHONE (OUTPATIENT)
Dept: INTERNAL MEDICINE CLINIC | Facility: CLINIC | Age: 38
End: 2023-10-25

## 2023-10-25 VITALS
HEART RATE: 84 BPM | WEIGHT: 190 LBS | SYSTOLIC BLOOD PRESSURE: 132 MMHG | DIASTOLIC BLOOD PRESSURE: 84 MMHG | RESPIRATION RATE: 18 BRPM | TEMPERATURE: 98 F | BODY MASS INDEX: 31 KG/M2 | OXYGEN SATURATION: 100 %

## 2023-10-25 DIAGNOSIS — H16.141 SPK (SUPERFICIAL PUNCTATE KERATITIS), RIGHT: Primary | ICD-10-CM

## 2023-10-25 PROCEDURE — 99214 OFFICE O/P EST MOD 30 MIN: CPT

## 2023-10-25 PROCEDURE — 99213 OFFICE O/P EST LOW 20 MIN: CPT

## 2023-10-25 RX ORDER — TETRACAINE HYDROCHLORIDE 5 MG/ML
1 SOLUTION OPHTHALMIC ONCE
Status: COMPLETED | OUTPATIENT
Start: 2023-10-25 | End: 2023-10-25

## 2023-10-25 RX ORDER — POLYMYXIN B SULFATE AND TRIMETHOPRIM 1; 10000 MG/ML; [USP'U]/ML
1 SOLUTION OPHTHALMIC
Qty: 10 ML | Refills: 0 | Status: SHIPPED | OUTPATIENT
Start: 2023-10-25 | End: 2023-10-30

## 2023-10-25 NOTE — TELEPHONE ENCOUNTER
LMTCB #1 to triage symptoms of eye pain for appt today. Per SM, she thinks it may be more appropriate to be seen in IC due to more equipment there to look at eye. SM would like s/s triaged, then let SM decide if pt can still be seen in office today. Also sent mcm.

## 2023-10-25 NOTE — TELEPHONE ENCOUNTER
Spoke with [employer]. They are able to see her at 2:00, stated to have pt come over then.      Left detailed message with pt:     [address], [place], [address]  Phone: [phone]    Asked pt to call back so I know she got message to be there at 2:00 or shortly after

## 2023-10-25 NOTE — TELEPHONE ENCOUNTER
Spoke with pt. Pt stated past 2 nights she has woken up by stabbing right eye pain. Pain goes away. Eye is tearing. No redness, no discharge, except for tears. Pt states it's \"uncomfortable\" during the day, but the stabbing pain it only at night. No vision changes. Pt states no injury to right eye. Spoke to [facility] and TO. They would like her to see an ophthalmologist today. Pt needs to have retina checked and pressures checked. Will try Dr. Malcolm Salinas and Jerrod's office to see if they can see her today. Or Dr. Christine Goff office. Canceled 2:00 OV. Left detailed message that we cxled 2:00 appt and trying to find ophthalmology appt. DR. Christine Goff office stated both doctors in surgery today, so no openings today. Dr. Damian Rubin [phone] office closed till 12:30.  Will try again

## 2024-03-31 ENCOUNTER — PATIENT MESSAGE (OUTPATIENT)
Dept: INTERNAL MEDICINE CLINIC | Facility: CLINIC | Age: 39
End: 2024-03-31

## 2024-04-01 NOTE — TELEPHONE ENCOUNTER
Please give her 45 minutes. But please make sure she is aware she will  both a Px and a visit charge.

## 2024-04-01 NOTE — TELEPHONE ENCOUNTER
From: Mary Thacker  To: Lizette Florence  Sent: 3/31/2024 10:50 AM CDT  Subject: Appt update    Hello! I have scheduled a followup appt with Nat on April 9th but wanted to ask if it can be more than 30 min. When I met with Nat last month, she mentioned that at this followup, we can discuss the Zoloft I started, get a cervical cancer screening with her, and also a blood panel to see if my anxiety is affected by anything physical. I'd love to be able to compete all these this in one visit. Thank you!

## 2024-04-08 NOTE — PROGRESS NOTES
HPI:   Mary Thacker is a 39 year old female who presents for a complete physical exam. Pt will see gyne for her pap smear because she is on her menses today. Symptoms: denies discharge, itching, burning or dysuria, periods are regular, flow is 5-6 days. Patient complains of none.   Regular SBE- no,Sexually active- no,  Contraception- none. STD history- none    Pt is here for a recheck of anxiety. Has been tolerating the meds- Zoloft well. Pt has not taken the xanax because it made her tired. Pt has only been taking the Zoloft. Denies any side effects from the meds. Mood has been good. Pt usually gets real hahn around her menses and this time she di not which made her feel good. Patient's appetite is good.Pt denies headaches,tics,or insomnia.No depressive symptoms or suicidal ideations. Would like to continue the same medication. Pt has been seeing a counselor via video.    Screening:  Immunization History   Administered Date(s) Administered    Covid-19 Vaccine Pfizer 30 mcg/0.3 ml 03/22/2021, 04/12/2021, 11/23/2021    DTAP 02/26/1985, 05/21/1985, 07/16/1985, 07/17/1986, 01/22/1990    FLUZONE 6 months and older PFS 0.5 ml (11936) 10/04/2018, 10/31/2020    Fluarix 6 Months And Older 0.5 ml prefilled syringe (04601) 10/04/2018    Flublok Quad Influenza Vaccine (73654) 10/17/2021, 11/11/2022    Flucelvax 0.5 Ml Quad PFS Single Dose 11/01/2019    HEP B 07/10/1995, 08/10/1995, 12/17/1999    Influenza 10/13/2016, 11/03/2019, 10/14/2023    MMR 04/08/1986, 09/10/1999    OPV 02/26/1985, 05/21/1985, 07/17/1986, 01/22/1990    TD 02/17/1999    TDAP 11/14/2018, 10/03/2021    Tb Intradermal Test 07/31/2012        Menarche- 11 yr  PAP- 5/1/19  Any abnormal pap smears- none  Pregnancies- 1, Live births- 1  Mammogram-  none   Any breast cancer- maternal great aunt, maternal aunt, or any gynecological cancer- none, any cancers- self -skin melanoma  Labs- 8/3/23  DEXA over 65yr- n/a  Flu shot-  10/14/23  Colon- n/a  Glasses/contacts-  yes, last exam- 7/2023  Dental visits- 3/2024    Immunization History   Administered Date(s) Administered    Covid-19 Vaccine Pfizer 30 mcg/0.3 ml 03/22/2021, 04/12/2021, 11/23/2021    DTAP 02/26/1985, 05/21/1985, 07/16/1985, 07/17/1986, 01/22/1990    FLUZONE 6 months and older PFS 0.5 ml (31346) 10/04/2018, 10/31/2020    Fluarix 6 Months And Older 0.5 ml prefilled syringe (80005) 10/04/2018    Flublok Quad Influenza Vaccine (33536) 10/17/2021, 11/11/2022    Flucelvax 0.5 Ml Quad PFS Single Dose 11/01/2019    HEP B 07/10/1995, 08/10/1995, 12/17/1999    Influenza 10/13/2016, 11/03/2019, 10/14/2023    MMR 04/08/1986, 09/10/1999    OPV 02/26/1985, 05/21/1985, 07/17/1986, 01/22/1990    TD 02/17/1999    TDAP 11/14/2018, 10/03/2021    Tb Intradermal Test 07/31/2012     Wt Readings from Last 6 Encounters:   04/09/24 201 lb 3.2 oz (91.3 kg)   03/11/24 203 lb 9.6 oz (92.4 kg)   10/25/23 190 lb (86.2 kg)   03/22/23 191 lb 6.4 oz (86.8 kg)   01/13/23 196 lb 3.2 oz (89 kg)   12/16/22 190 lb (86.2 kg)     Body mass index is 32.47 kg/m².     Lab Results   Component Value Date    GLU 90 08/03/2023    GLU 93 04/27/2021     Lab Results   Component Value Date    CHOLEST 141 08/03/2023    CHOLEST 138 04/27/2021     Lab Results   Component Value Date    HDL 38 (L) 08/03/2023    HDL 33 (L) 04/27/2021     Lab Results   Component Value Date    LDL 79 08/03/2023    LDL 72 04/27/2021     Lab Results   Component Value Date    AST 17 08/03/2023    AST 16 04/27/2021     Lab Results   Component Value Date    ALT 26 08/03/2023    ALT 31 04/27/2021       Current Outpatient Medications   Medication Sig Dispense Refill    Cholecalciferol (VITAMIN D) 50 MCG (2000 UT) Oral Cap Take 1 capsule (2,000 Units total) by mouth daily.      sertraline (ZOLOFT) 50 MG Oral Tab Take 1 tablet (50 mg total) by mouth daily. 30 tablet 0    ALPRAZolam (XANAX) 0.25 MG Oral Tab Take 1 tablet (0.25 mg total) by mouth 2 (two) times daily as needed for Anxiety. 10 tablet  0    PATIENT SUPPLIED MEDICATION Multivitamin        Past Medical History:   Diagnosis Date    Pain with bowel movements       Past Surgical History:   Procedure Laterality Date    OTHER SURGICAL HISTORY  01/01/2005    corneal lasik      Family History   Problem Relation Age of Onset    Diabetes Maternal Grandfather         mellitus    Hypertension Father     Colon Polyps Father     Diabetes Father     Diabetes Mother     Heart Attack Maternal Grandmother     Breast Cancer Maternal Aunt     Breast Cancer Paternal Aunt       Social History:   Social History     Socioeconomic History    Marital status:    Tobacco Use    Smoking status: Never     Passive exposure: Never    Smokeless tobacco: Never   Vaping Use    Vaping Use: Never used   Substance and Sexual Activity    Alcohol use: Yes     Alcohol/week: 2.0 standard drinks of alcohol     Types: 2 Glasses of wine per week     Comment: occ    Drug use: No   Other Topics Concern    Caffeine Concern Yes     Comment: 1.5 cups of coffee daily    Exercise No    Seat Belt Yes     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 : no. Children: 1.   Exercise: minimal.  Diet: watches minimally     REVIEW OF SYSTEMS:   GENERAL: feels well otherwise  SKIN: denies any unusual skin lesions  EYES:denies blurred vision or double vision  HEENT: denies nasal congestion, sinus pain or ST  LUNGS: denies shortness of breath with exertion  CARDIOVASCULAR: denies chest pain on exertion  GI: denies abdominal pain,denies heartburn  : denies dysuria, vaginal discharge or itching,periods regular   MUSCULOSKELETAL: denies back pain  NEURO: denies headaches  PSYCHE: +anxiety  HEMATOLOGIC: denies hx of anemia  ENDOCRINE: denies thyroid history  ALL/ASTHMA:+ allergies    EXAM:   /76 (BP Location: Left arm, Patient Position: Sitting, Cuff Size: adult)   Pulse 78   Temp 97.5 °F (36.4 °C) (Temporal)   Resp 16   Ht 5' 6\" (1.676 m)   Wt 201 lb 3.2 oz (91.3 kg)   LMP 04/09/2024 (Approximate)    SpO2 98%   BMI 32.47 kg/m²   Body mass index is 32.47 kg/m².   GENERAL: well developed, well nourished,in no apparent distress  SKIN: no rashes,no suspicious lesions  HEENT: atraumatic, normocephalic,ears and throat are clear  EYES:PERRLA, EOMI, normal optic disk,conjunctiva are clear  NECK: supple,no adenopathy,no bruits  CHEST: no chest tenderness  BREAST: no dominant or suspicious mass  LUNGS: clear to auscultation  CARDIO: RRR without murmur  GI: good BS's,no masses, HSM or tenderness  :deferred will see gyne. Pt on menses.  MUSCULOSKELETAL: back is not tender,FROM of the back  EXTREMITIES: no cyanosis, clubbing or edema  NEURO: Oriented times three,cranial nerves are intact,motor and sensory are grossly intact    ASSESSMENT AND PLAN:   Mary Thacker is a 39 year old female who presents for a complete physical exam.. Self breast exam explained. Health maintenance, will check fasting labs. . Pt' s weight is Body mass index is 32.47 kg/m²., recommended low fat diet and aerobic exercise 30 minutes three times weekly.  The patient indicates understanding of these issues and agrees to the plan.  The patient is asked to return for CPX in one year.  1. Routine general medical examination at a health care facility    - CBC With Differential With Platelet; Future  - Comp Metabolic Panel (14); Future  - Lipid Panel; Future  - Hemoglobin A1C; Future  - TSH W Reflex To Free T4; Future  - Vitamin D; Future    2. Vitamin D deficiency  Check level  - Cholecalciferol (VITAMIN D) 50 MCG (2000 UT) Oral Cap; Take 1 capsule (2,000 Units total) by mouth daily.  - Vitamin D; Future    3. Anxiety and depression  - stable, continue current dose  - continue counseling.  - sertraline (ZOLOFT) 50 MG Oral Tab; Take 1 tablet (50 mg total) by mouth daily.  Dispense: 90 tablet; Refill: 1    Encounter Diagnoses   Name Primary?    Routine general medical examination at a health care facility Yes    Vitamin D deficiency     Anxiety and  depression        Orders Placed This Encounter   Procedures    CBC With Differential With Platelet    Comp Metabolic Panel (14)    Lipid Panel    Hemoglobin A1C    TSH W Reflex To Free T4    Vitamin D       Meds & Refills for this Visit:  Requested Prescriptions     Signed Prescriptions Disp Refills    sertraline (ZOLOFT) 50 MG Oral Tab 90 tablet 1     Sig: Take 1 tablet (50 mg total) by mouth daily.       Imaging & Consults:  None

## 2024-04-09 ENCOUNTER — LAB ENCOUNTER (OUTPATIENT)
Dept: LAB | Age: 39
End: 2024-04-09
Attending: FAMILY MEDICINE
Payer: COMMERCIAL

## 2024-04-09 ENCOUNTER — OFFICE VISIT (OUTPATIENT)
Dept: INTERNAL MEDICINE CLINIC | Facility: CLINIC | Age: 39
End: 2024-04-09
Payer: COMMERCIAL

## 2024-04-09 VITALS
SYSTOLIC BLOOD PRESSURE: 124 MMHG | BODY MASS INDEX: 32.33 KG/M2 | OXYGEN SATURATION: 98 % | RESPIRATION RATE: 16 BRPM | HEIGHT: 66 IN | WEIGHT: 201.19 LBS | DIASTOLIC BLOOD PRESSURE: 76 MMHG | TEMPERATURE: 98 F | HEART RATE: 78 BPM

## 2024-04-09 DIAGNOSIS — F32.A ANXIETY AND DEPRESSION: ICD-10-CM

## 2024-04-09 DIAGNOSIS — E55.9 VITAMIN D DEFICIENCY: ICD-10-CM

## 2024-04-09 DIAGNOSIS — F41.9 ANXIETY AND DEPRESSION: ICD-10-CM

## 2024-04-09 DIAGNOSIS — Z00.00 ROUTINE GENERAL MEDICAL EXAMINATION AT A HEALTH CARE FACILITY: ICD-10-CM

## 2024-04-09 DIAGNOSIS — Z00.00 ROUTINE GENERAL MEDICAL EXAMINATION AT A HEALTH CARE FACILITY: Primary | ICD-10-CM

## 2024-04-09 LAB
ALBUMIN SERPL-MCNC: 4.4 G/DL (ref 3.2–4.8)
ALBUMIN/GLOB SERPL: 1.6 {RATIO} (ref 1–2)
ALP LIVER SERPL-CCNC: 67 U/L
ALT SERPL-CCNC: 19 U/L
ANION GAP SERPL CALC-SCNC: 6 MMOL/L (ref 0–18)
AST SERPL-CCNC: 17 U/L (ref ?–34)
BASOPHILS # BLD AUTO: 0.03 X10(3) UL (ref 0–0.2)
BASOPHILS NFR BLD AUTO: 0.3 %
BILIRUB SERPL-MCNC: 0.5 MG/DL (ref 0.3–1.2)
BUN BLD-MCNC: 9 MG/DL (ref 9–23)
BUN/CREAT SERPL: 12.9 (ref 10–20)
CALCIUM BLD-MCNC: 9.2 MG/DL (ref 8.7–10.4)
CHLORIDE SERPL-SCNC: 107 MMOL/L (ref 98–112)
CHOLEST SERPL-MCNC: 162 MG/DL (ref ?–200)
CO2 SERPL-SCNC: 27 MMOL/L (ref 21–32)
CREAT BLD-MCNC: 0.7 MG/DL
DEPRECATED RDW RBC AUTO: 43.7 FL (ref 35.1–46.3)
EGFRCR SERPLBLD CKD-EPI 2021: 113 ML/MIN/1.73M2 (ref 60–?)
EOSINOPHIL # BLD AUTO: 0.27 X10(3) UL (ref 0–0.7)
EOSINOPHIL NFR BLD AUTO: 3.1 %
ERYTHROCYTE [DISTWIDTH] IN BLOOD BY AUTOMATED COUNT: 13.4 % (ref 11–15)
EST. AVERAGE GLUCOSE BLD GHB EST-MCNC: 114 MG/DL (ref 68–126)
FASTING PATIENT LIPID ANSWER: YES
FASTING STATUS PATIENT QL REPORTED: YES
GLOBULIN PLAS-MCNC: 2.8 G/DL (ref 2.8–4.4)
GLUCOSE BLD-MCNC: 89 MG/DL (ref 70–99)
HBA1C MFR BLD: 5.6 % (ref ?–5.7)
HCT VFR BLD AUTO: 41.5 %
HDLC SERPL-MCNC: 35 MG/DL (ref 40–59)
HGB BLD-MCNC: 13.1 G/DL
IMM GRANULOCYTES # BLD AUTO: 0.03 X10(3) UL (ref 0–1)
IMM GRANULOCYTES NFR BLD: 0.3 %
LDLC SERPL CALC-MCNC: 105 MG/DL (ref ?–100)
LYMPHOCYTES # BLD AUTO: 1.57 X10(3) UL (ref 1–4)
LYMPHOCYTES NFR BLD AUTO: 18.1 %
MCH RBC QN AUTO: 27.8 PG (ref 26–34)
MCHC RBC AUTO-ENTMCNC: 31.6 G/DL (ref 31–37)
MCV RBC AUTO: 88.1 FL
MONOCYTES # BLD AUTO: 0.68 X10(3) UL (ref 0.1–1)
MONOCYTES NFR BLD AUTO: 7.9 %
NEUTROPHILS # BLD AUTO: 6.08 X10 (3) UL (ref 1.5–7.7)
NEUTROPHILS # BLD AUTO: 6.08 X10(3) UL (ref 1.5–7.7)
NEUTROPHILS NFR BLD AUTO: 70.3 %
NONHDLC SERPL-MCNC: 127 MG/DL (ref ?–130)
OSMOLALITY SERPL CALC.SUM OF ELEC: 288 MOSM/KG (ref 275–295)
PLATELET # BLD AUTO: 315 10(3)UL (ref 150–450)
POTASSIUM SERPL-SCNC: 4.2 MMOL/L (ref 3.5–5.1)
PROT SERPL-MCNC: 7.2 G/DL (ref 5.7–8.2)
RBC # BLD AUTO: 4.71 X10(6)UL
SODIUM SERPL-SCNC: 140 MMOL/L (ref 136–145)
TRIGL SERPL-MCNC: 122 MG/DL (ref 30–149)
TSI SER-ACNC: 0.64 MIU/ML (ref 0.55–4.78)
VIT D+METAB SERPL-MCNC: 58 NG/ML (ref 30–100)
VLDLC SERPL CALC-MCNC: 21 MG/DL (ref 0–30)
WBC # BLD AUTO: 8.7 X10(3) UL (ref 4–11)

## 2024-04-09 PROCEDURE — 3074F SYST BP LT 130 MM HG: CPT | Performed by: FAMILY MEDICINE

## 2024-04-09 PROCEDURE — 3008F BODY MASS INDEX DOCD: CPT | Performed by: FAMILY MEDICINE

## 2024-04-09 PROCEDURE — 80061 LIPID PANEL: CPT | Performed by: FAMILY MEDICINE

## 2024-04-09 PROCEDURE — 99212 OFFICE O/P EST SF 10 MIN: CPT | Performed by: FAMILY MEDICINE

## 2024-04-09 PROCEDURE — 83036 HEMOGLOBIN GLYCOSYLATED A1C: CPT | Performed by: FAMILY MEDICINE

## 2024-04-09 PROCEDURE — 3078F DIAST BP <80 MM HG: CPT | Performed by: FAMILY MEDICINE

## 2024-04-09 PROCEDURE — 80050 GENERAL HEALTH PANEL: CPT | Performed by: FAMILY MEDICINE

## 2024-04-09 PROCEDURE — 99395 PREV VISIT EST AGE 18-39: CPT | Performed by: FAMILY MEDICINE

## 2024-04-09 PROCEDURE — 82306 VITAMIN D 25 HYDROXY: CPT | Performed by: FAMILY MEDICINE

## 2024-04-09 RX ORDER — MULTIVIT-MIN/IRON/FOLIC ACID/K 18-600-40
1 CAPSULE ORAL DAILY
COMMUNITY

## 2024-04-29 ENCOUNTER — OFFICE VISIT (OUTPATIENT)
Dept: OBGYN CLINIC | Facility: CLINIC | Age: 39
End: 2024-04-29
Payer: COMMERCIAL

## 2024-04-29 VITALS
SYSTOLIC BLOOD PRESSURE: 115 MMHG | DIASTOLIC BLOOD PRESSURE: 70 MMHG | BODY MASS INDEX: 32.59 KG/M2 | HEART RATE: 86 BPM | HEIGHT: 66 IN | WEIGHT: 202.81 LBS

## 2024-04-29 DIAGNOSIS — Z01.419 WELL WOMAN EXAM WITH ROUTINE GYNECOLOGICAL EXAM: Primary | ICD-10-CM

## 2024-04-29 DIAGNOSIS — Z12.4 CERVICAL CANCER SCREENING: ICD-10-CM

## 2024-04-29 PROCEDURE — 99385 PREV VISIT NEW AGE 18-39: CPT

## 2024-04-29 PROCEDURE — 3078F DIAST BP <80 MM HG: CPT

## 2024-04-29 PROCEDURE — 87624 HPV HI-RISK TYP POOLED RSLT: CPT

## 2024-04-29 PROCEDURE — 3074F SYST BP LT 130 MM HG: CPT

## 2024-04-29 PROCEDURE — 3008F BODY MASS INDEX DOCD: CPT

## 2024-04-29 NOTE — PROGRESS NOTES
Mary Thacker is a 39 year old female  Patient's last menstrual period was 2024 (approximate).   Chief Complaint   Patient presents with    New Patient     Establish care     Wellness Visit     Last pap smear was 18, negative     Other     Patient said YES to student in the room    .    OBSTETRICS HISTORY:  OB History    Para Term  AB Living   1         1   SAB IAB Ectopic Multiple Live Births           1      # Outcome Date GA Lbr Jose Daniel/2nd Weight Sex Type Anes PTL Lv   1      F NORMAL SPONT   SUSIE       GYNE HISTORY:  Periods regular monthly bleeds for 5 days    History   Sexual Activity    Sexual activity: Not on file        Hx Prior Abnormal Pap: No  Pap Date: 18  Pap Result Notes: negative        MEDICAL HISTORY:  Past Medical History:    Infertility, female    Pain with bowel movements       SURGICAL HISTORY:  Past Surgical History:   Procedure Laterality Date    Other surgical history  2005    corneal lasik       SOCIAL HISTORY:  Social History     Socioeconomic History    Marital status:      Spouse name: Not on file    Number of children: Not on file    Years of education: Not on file    Highest education level: Not on file   Occupational History    Not on file   Tobacco Use    Smoking status: Never     Passive exposure: Never    Smokeless tobacco: Never   Vaping Use    Vaping status: Never Used   Substance and Sexual Activity    Alcohol use: Yes     Alcohol/week: 2.0 standard drinks of alcohol     Types: 2 Glasses of wine per week     Comment: occ    Drug use: No    Sexual activity: Not on file   Other Topics Concern     Service Not Asked    Blood Transfusions Not Asked    Caffeine Concern Yes     Comment: 1.5 cups of coffee daily    Occupational Exposure Not Asked    Hobby Hazards Not Asked    Sleep Concern Not Asked    Stress Concern Not Asked    Weight Concern Not Asked    Special Diet Not Asked    Back Care Not Asked    Exercise No    Bike  Helmet Not Asked    Seat Belt Yes    Self-Exams Not Asked   Social History Narrative    Not on file     Social Determinants of Health     Financial Resource Strain: Not on file   Food Insecurity: Not on file   Transportation Needs: Not on file   Physical Activity: Not on file   Stress: Not on file   Social Connections: Not on file   Housing Stability: At Risk (8/18/2023)    Received from [facility]     Living Situation: Not on file     Housing Problems: Not on file       FAMILY HISTORY:  Family History   Problem Relation Age of Onset    Hypertension Father     Colon Polyps Father     Diabetes Father     Diabetes Mother     Heart Attack Maternal Grandmother     Diabetes Maternal Grandfather         mellitus    Breast Cancer Maternal Aunt     Breast Cancer Paternal Aunt        MEDICATIONS:    Current Outpatient Medications:     Cholecalciferol (VITAMIN D) 50 MCG (2000 UT) Oral Cap, Take 1 capsule (2,000 Units total) by mouth daily., Disp: , Rfl:     sertraline (ZOLOFT) 50 MG Oral Tab, Take 1 tablet (50 mg total) by mouth daily., Disp: 90 tablet, Rfl: 1    ALPRAZolam (XANAX) 0.25 MG Oral Tab, Take 1 tablet (0.25 mg total) by mouth 2 (two) times daily as needed for Anxiety. (Patient taking differently: Take 1 tablet (0.25 mg total) by mouth as needed for Anxiety.), Disp: 10 tablet, Rfl: 0    PATIENT SUPPLIED MEDICATION, Multivitamin, Disp: , Rfl:     ALLERGIES:    Allergies   Allergen Reactions    Amoxicillin NAUSEA AND VOMITING    Levofloxacin ANAPHYLAXIS    Dander     Dust     Zithromax RASH     Caps           Review of Systems:  Constitutional:  Denies fatigue, night sweats, hot flashes  Eyes:  denies blurred or double vision  Cardiovascular:  denies chest pain or palpitations  Respiratory:  denies shortness of breath  Gastrointestinal:  denies heartburn, abdominal pain, diarrhea or constipation  Genitourinary:  denies dysuria, incontinence, abnormal vaginal discharge, vaginal  itching  Musculoskeletal:  denies back pain.  Skin/Breast:  Denies any breast pain, lumps, or discharge.   Neurological:  denies headaches, extremity weakness or numbness.  Psychiatric: denies depression or anxiety.  Endocrine:   denies excessive thirst or urination.  Heme/Lymph:  denies history of anemia, easy bruising or bleeding.      PHYSICAL EXAM:   Constitutional: well developed, well nourished  Head/Face: normocephalic  Neck/Thyroid: thyroid symmetric, no thyromegaly, no nodules, no adenopathy  Lymphatic:no abnormal supraclavicular or axillary adenopathy is noted  Breast: normal without palpable masses, tenderness, asymmetry, nipple discharge, nipple retraction or skin changes  Abdomen:  soft, nontender, nondistended, no masses  Skin/Hair: no unusual rashes or bruises  Extremities: no edema, no cyanosis  Psychiatric:  Oriented to time, place, person and situation. Appropriate mood and affect    Pelvic Exam:  External Genitalia: normal appearance, hair distribution, and no lesions  Urethral Meatus:  normal in size, location, without lesions and prolapse  Bladder:  No fullness, masses or tenderness  Vagina:  Normal appearance without lesions, no abnormal discharge  Cervix:  Normal without tenderness on motion  Uterus: normal in size, contour, position, mobility, without tenderness  Adnexa: normal without masses or tenderness  Perineum: normal  Anus: no hemorroids     Assessment & Plan:  Diagnoses and all orders for this visit:    Well woman exam with routine gynecological exam    Cervical cancer screening  -     Hpv High Risk , Thin Prep Collect; Future  -     ThinPrep PAP Smear B; Future

## 2024-04-30 LAB — HPV I/H RISK 1 DNA SPEC QL NAA+PROBE: NEGATIVE

## 2024-05-03 LAB
.: NORMAL
.: NORMAL

## 2024-09-23 DIAGNOSIS — F41.9 ANXIETY AND DEPRESSION: ICD-10-CM

## 2024-09-23 DIAGNOSIS — F32.A ANXIETY AND DEPRESSION: ICD-10-CM

## 2024-10-07 ENCOUNTER — LAB ENCOUNTER (OUTPATIENT)
Dept: LAB | Facility: REFERENCE LAB | Age: 39
End: 2024-10-07
Attending: FAMILY MEDICINE
Payer: COMMERCIAL

## 2024-10-07 DIAGNOSIS — Z13.1 SCREENING FOR DIABETES MELLITUS: ICD-10-CM

## 2024-10-07 LAB
EST. AVERAGE GLUCOSE BLD GHB EST-MCNC: 120 MG/DL (ref 68–126)
HBA1C MFR BLD: 5.8 % (ref ?–5.7)

## 2024-10-07 PROCEDURE — 83036 HEMOGLOBIN GLYCOSYLATED A1C: CPT | Performed by: FAMILY MEDICINE

## 2024-10-21 ENCOUNTER — TELEMEDICINE (OUTPATIENT)
Dept: INTERNAL MEDICINE CLINIC | Facility: CLINIC | Age: 39
End: 2024-10-21
Payer: COMMERCIAL

## 2024-10-21 DIAGNOSIS — R10.10 PAIN OF UPPER ABDOMEN: Primary | ICD-10-CM

## 2024-10-21 DIAGNOSIS — D50.9 IRON DEFICIENCY ANEMIA, UNSPECIFIED IRON DEFICIENCY ANEMIA TYPE: ICD-10-CM

## 2024-10-21 PROCEDURE — 99212 OFFICE O/P EST SF 10 MIN: CPT | Performed by: FAMILY MEDICINE

## 2024-10-21 NOTE — PROGRESS NOTES
Virtual Video Check-In     This visit is conducted using Telemedicine with live, interactive video and audio.    Mary Thacker, who has verified his/her identification by name and , verbally consents to a Virtual/Telephone Check-In visit on 10/21/24.  Patient confirms that he/she is in the [facility] at the time of service.  Patient understands and accepts financial responsibility for any deductible, co-insurance and/or co-pays associated with this service.    TIME: 15 minutes      HPI: The patient presents for a recheck after ER visit for complaints of abdominal pain. Was seen 1 days ago.   History from the ER: 39-year-old female presenting to ED with complaint of generalized abdominal pain.   Started around 11:00 p.m. yesterday evening and states prevented from sleeping. Initially located in the bilateral flank region now radiating from the epigastrium to the lower abdomen .   The following tests were done: Labs, EKG and CT of abdomen. Pt is on the following meds: Zofran and Protonix. The patient is feeling  better. Pt states the medication and keeping her diet more bland as helped.  Also discussed CT results of gallstones and her anemia with the Pt. Pt already takes an iron supplement daily due to her heavy menses.       ROS:  GENERAL:  Denies fever, chills,weight change, fatigue  SKIN: Denies rashes, skin lesions  EYES: Denies blurred vision or double vision  HENT: Denies congestion,sore throat or ear pain.   CHEST: Denies chest pain, or palpitations  LUNGS: Denies shortness of breath,wheezing or cough  GI: see HPI  MUSCULOSKELETAL: Denies any arthralgia,myalgias or swollen joints  LYMPH:  Denies lymphadenopathy  NEURO:  Denies headaches and lightheadedness.   PSYCH: denies depression or anxiety    ALLERGIES:  Allergies[1]    Current Outpatient Medications   Medication Sig Dispense Refill    sertraline (ZOLOFT) 50 MG Oral Tab Take 1 tablet (50 mg total) by mouth daily. 90 tablet 1    Cholecalciferol  (VITAMIN D) 50 MCG (2000 UT) Oral Cap Take 1 capsule (2,000 Units total) by mouth daily.      ALPRAZolam (XANAX) 0.25 MG Oral Tab Take 1 tablet (0.25 mg total) by mouth 2 (two) times daily as needed for Anxiety. (Patient taking differently: Take 1 tablet (0.25 mg total) by mouth as needed for Anxiety.) 10 tablet 0    PATIENT SUPPLIED MEDICATION Multivitamin         Past Medical History:    Infertility, female    Pain with bowel movements      Past Surgical History:   Procedure Laterality Date    Other surgical history  01/01/2005    corneal lasik      Social History     Socioeconomic History    Marital status:    Tobacco Use    Smoking status: Never     Passive exposure: Never    Smokeless tobacco: Never   Vaping Use    Vaping status: Never Used   Substance and Sexual Activity    Alcohol use: Yes     Alcohol/week: 2.0 standard drinks of alcohol     Types: 2 Glasses of wine per week     Comment: occ    Drug use: No   Other Topics Concern    Caffeine Concern Yes     Comment: 1.5 cups of coffee daily    Exercise No    Seat Belt Yes     Social Drivers of Health      Received from [facility]       Family History   Problem Relation Age of Onset    Hypertension Father     Colon Polyps Father     Diabetes Father     Diabetes Mother     Heart Attack Maternal Grandmother     Diabetes Maternal Grandfather         mellitus    Breast Cancer Maternal Aunt     Breast Cancer Paternal Aunt         PE:  No vital signs were taken for this video visit.  GENERAL: well developed, well nourished, in no acute distress  SKIN: no rashes on visible parts of skin  HEENT: normocephalic, atraumatic, conjunctiva clear  LUNGS: regular breathing rate and effort with no audible respiratory distress  NEURO: A&Ox3  PSYCH: pleasant mood and affect, appropriate judgement and insight    ASSESSMENT/PLAN:    Encounter Diagnoses   Name Primary?    Pain of upper abdomen Yes    Iron deficiency anemia, unspecified iron  deficiency anemia type        No orders of the defined types were placed in this encounter.      Meds & Refills for this Visit:  Requested Prescriptions      No prescriptions requested or ordered in this encounter       Imaging & Consults:  None    1. Pain of upper abdomen  - continue Protonix daily for the GERD for next 4-8 weeks. May wean off and see how she does.  Pt needs to lose wgt to improve symptoms. Also patient should avoid eating or drinking two to three hours before bed. The following should be avoided: caffeine containing drinks/foods, tobacco, mints, fried foods or anyother foods noted by the patient to cause heartburn sx's. Pt should elevate the head of the bed four to six inches to help alleviate sx's.   Pt to also watch for upper right abdominal pain, it may then be related to her gallbladder. Pt has the information of the surgeon to go for a consult.      2. Iron deficiency anemia, unspecified iron deficiency anemia type  Continue iron supplement, recheck CBC in 3-6 months.    The patient indicates understanding of these issues and agrees to the plan.  The patient is asked to call with any other concerns.    *Please note that the following visit was completed using two-way, real-time interactive audio and/or video communication.  This has been done in good elliot to provide continuity of care in the best interest of the provider-patient relationship, due to the ongoing public health crisis/national emergency and because of restrictions of visitation.  There are limitations of this visit as physical exam could not be fully performed.  Every conscious effort was taken to allow for sufficient and adequate time.  Included in this visit, time may have been spent reviewing labs, medications, radiology tests and decision-making.  Appropriate medical decision-making and tests are ordered as detailed in the plan of care above.  Coding/billing information is submitted for this visit based on complexity of  care and/or time spent for the visit.      Start Time: 9:30 am  End Time: 9:45 am    Lizette SAMUEL         [1]   Allergies  Allergen Reactions    Amoxicillin NAUSEA AND VOMITING    Levofloxacin ANAPHYLAXIS    Dander     Dust     Zithromax RASH     Caps

## 2024-11-07 ENCOUNTER — OFFICE VISIT (OUTPATIENT)
Facility: LOCATION | Age: 39
End: 2024-11-07
Payer: COMMERCIAL

## 2024-11-07 ENCOUNTER — TELEPHONE (OUTPATIENT)
Facility: LOCATION | Age: 39
End: 2024-11-07

## 2024-11-07 VITALS
HEART RATE: 67 BPM | TEMPERATURE: 97 F | OXYGEN SATURATION: 99 % | DIASTOLIC BLOOD PRESSURE: 87 MMHG | SYSTOLIC BLOOD PRESSURE: 132 MMHG

## 2024-11-07 DIAGNOSIS — K80.10 CALCULUS OF GALLBLADDER WITH CHOLECYSTITIS WITHOUT BILIARY OBSTRUCTION, UNSPECIFIED CHOLECYSTITIS ACUITY: Primary | ICD-10-CM

## 2024-11-07 PROCEDURE — 3079F DIAST BP 80-89 MM HG: CPT | Performed by: STUDENT IN AN ORGANIZED HEALTH CARE EDUCATION/TRAINING PROGRAM

## 2024-11-07 PROCEDURE — 99205 OFFICE O/P NEW HI 60 MIN: CPT | Performed by: STUDENT IN AN ORGANIZED HEALTH CARE EDUCATION/TRAINING PROGRAM

## 2024-11-07 PROCEDURE — 3075F SYST BP GE 130 - 139MM HG: CPT | Performed by: STUDENT IN AN ORGANIZED HEALTH CARE EDUCATION/TRAINING PROGRAM

## 2024-11-07 RX ORDER — PREDNISONE 20 MG/1
TABLET ORAL
COMMUNITY
Start: 2024-09-06 | End: 2024-11-07 | Stop reason: ALTCHOICE

## 2024-11-07 RX ORDER — CLINDAMYCIN HYDROCHLORIDE 300 MG/1
CAPSULE ORAL
COMMUNITY
Start: 2024-09-06 | End: 2024-11-07 | Stop reason: ALTCHOICE

## 2024-11-07 RX ORDER — ONDANSETRON 4 MG/1
TABLET, ORALLY DISINTEGRATING ORAL
COMMUNITY
Start: 2024-10-20 | End: 2024-11-07 | Stop reason: ALTCHOICE

## 2024-11-07 RX ORDER — PANTOPRAZOLE SODIUM 40 MG/1
40 TABLET, DELAYED RELEASE ORAL
COMMUNITY

## 2024-11-07 RX ORDER — MULTIVITAMIN
TABLET ORAL AS DIRECTED
COMMUNITY

## 2024-11-07 RX ORDER — PANTOPRAZOLE SODIUM 40 MG/10ML
INJECTION, POWDER, LYOPHILIZED, FOR SOLUTION INTRAVENOUS
COMMUNITY
Start: 2024-10-20 | End: 2024-11-07

## 2024-11-07 RX ORDER — MULTIVITAMIN
TABLET ORAL AS DIRECTED
COMMUNITY
End: 2024-11-07

## 2024-11-07 NOTE — H&P
New Patient Visit Note       Active Problems      1. Calculus of gallbladder with cholecystitis without biliary obstruction, unspecified cholecystitis acuity        Chief Complaint   Chief Complaint   Patient presents with    New Patient     NP - Gallstones, patient has recent imaging, ct and ut done, change in diet, pain in abdomen, painful all the time, no other symptoms.       History of Present Illness   39 year old female who is here with abdominal pain. Patient reprots experiencing severe right upper quadrant abdominal pain second week of October. The pain lasted for 5 hours without resolution and radiated to the upper quadrants and the upper back. Patient reports nausea but no vomiting. She believes the pain was incited by a heavy greasy meal. Since then she has had multiple recurrences of the pain with another visit to the ER. She underwent ultrasound and CT scan of the abdomen which showed cholelithiasis with mild fat infiltration concerning for acute cholecystitis. She has been following a low fat , bland diet over the past few weeks and was started on pantoprazole. She reports her symptoms were controlled until this morning when she experienced right upper quadrant  abdominal pain.       Allergies  Mary is allergic to amoxicillin, levofloxacin, penicillins, zithromax, dander, dust, dust mite extract, and cat hair extract.    Past Medical / Surgical / Social / Family History    The past medical and past surgical history have been reviewed by me today.    Past Medical History:    Infertility, female    Pain with bowel movements     Past Surgical History:   Procedure Laterality Date    Other surgical history  01/01/2005    corneal lasik       The family history and social history have been reviewed by me today.    Family History   Problem Relation Age of Onset    Hypertension Father     Colon Polyps Father     Diabetes Father     Diabetes Mother     Heart Attack Maternal Grandmother     Diabetes Maternal  Grandfather         mellitus    Breast Cancer Maternal Aunt     Breast Cancer Paternal Aunt      Social History     Socioeconomic History    Marital status:    Tobacco Use    Smoking status: Never     Passive exposure: Never    Smokeless tobacco: Never   Vaping Use    Vaping status: Never Used   Substance and Sexual Activity    Alcohol use: Yes     Alcohol/week: 2.0 standard drinks of alcohol     Types: 2 Glasses of wine per week     Comment: occ    Drug use: No   Other Topics Concern    Caffeine Concern Yes     Comment: 1.5 cups of coffee daily    Exercise No    Seat Belt Yes        Current Outpatient Medications:     clindamycin 300 MG Oral Cap, , Disp: , Rfl:     Multiple Vitamin (MULTI-VITAMIN) Oral Tab, Take by mouth As Directed., Disp: , Rfl:     Multiple Vitamin (MULTI-VITAMIN) Oral Tab, Take by mouth As Directed., Disp: , Rfl:     ondansetron 4 MG Oral Tablet Dispersible, , Disp: , Rfl:     pantoprazole 40 MG Intravenous Recon Soln, , Disp: , Rfl:     predniSONE 20 MG Oral Tab, , Disp: , Rfl:     sertraline (ZOLOFT) 50 MG Oral Tab, Take 1 tablet (50 mg total) by mouth daily., Disp: 90 tablet, Rfl: 1    Cholecalciferol (VITAMIN D) 50 MCG (2000 UT) Oral Cap, Take 1 capsule (2,000 Units total) by mouth daily., Disp: , Rfl:     ALPRAZolam (XANAX) 0.25 MG Oral Tab, Take 1 tablet (0.25 mg total) by mouth 2 (two) times daily as needed for Anxiety. (Patient taking differently: Take 1 tablet (0.25 mg total) by mouth as needed for Anxiety.), Disp: 10 tablet, Rfl: 0    PATIENT SUPPLIED MEDICATION, Multivitamin, Disp: , Rfl:       Review of Systems  The Review of Systems has been reviewed by me during today.  Review of Systems   Constitutional:  Negative for chills, diaphoresis, fatigue and fever.   HENT:  Negative for ear discharge, ear pain and sore throat.    Eyes:  Negative for pain and discharge.   Respiratory:  Negative for cough, chest tightness and shortness of breath.    Cardiovascular:  Negative for  chest pain, palpitations and leg swelling.   Gastrointestinal:  Positive for abdominal pain. Negative for abdominal distention, blood in stool, constipation, diarrhea, nausea and vomiting.   Genitourinary:  Negative for dysuria, frequency, hematuria and urgency.   Skin:  Negative for color change, pallor and rash.   Neurological:  Negative for weakness, light-headedness, numbness and headaches.   Hematological:  Negative for adenopathy. Does not bruise/bleed easily.   Psychiatric/Behavioral:  Negative for agitation and confusion.        Physical Findings   /87 (BP Location: Left arm, Patient Position: Sitting, Cuff Size: large)   Pulse 67   Temp 97.1 °F (36.2 °C) (Temporal)   LMP 04/09/2024 (Approximate)   SpO2 99%   Physical Exam  Constitutional:       Appearance: Normal appearance.   HENT:      Head: Normocephalic and atraumatic.   Cardiovascular:      Pulses: Normal pulses.   Pulmonary:      Effort: Pulmonary effort is normal.   Abdominal:      General: Abdomen is flat. There is no distension.      Palpations: Abdomen is soft.      Tenderness: There is no abdominal tenderness. There is no guarding or rebound.   Skin:     General: Skin is warm.      Capillary Refill: Capillary refill takes less than 2 seconds.   Neurological:      Mental Status: She is alert and oriented to person, place, and time. Mental status is at baseline.             Assessment/Plan  1. Calculus of gallbladder with cholecystitis without biliary obstruction, unspecified cholecystitis acuity        Mary Thacker is a 39 year old female referred by Yousuf Boudreaux MD for evaluation of gallstones. She has abdominal pain in the right upper quadrant. I reviewed her ultrasound and CT scan and it shows a 2 cm stone in the neck of the gallbladder. The symptoms and radiologic findings are consistent with symptomatic cholelithiasis. The benefits of surgical intervention to address the underlying pathology were discussed with the patient. The  alternatives to surgery including non-operative treatment with symptoms control were discussed with the patient. The risks of surgical intervention were explained to the patient in detail including but not limited to intra-operative or post-operative bleeding, post-operative incision or intra-abdominal infection, incorrect diagnosis, injury to adjacent organs and structures, and bile duct injury requiring possible immediate or delayed reconstruction potentially by a hepatobiliary surgeon, postoperative bile leak,  retained common bile duct stones, the need for post-procedure ERCP as well as the need for further therapeutic diagnostic or surgical intervention. The possibility of conversion to open procedure was also explained to the patient. The patient did voice understanding. Allpertinent questions were answered to the patient's satisfaction after which the patient provided willing and informed consent to proceed with surgery.  .       Jennifer Umana MD

## 2024-11-07 NOTE — TELEPHONE ENCOUNTER
EPHRAIM MCCALL Patient  Member ID  [id number]    Date of Birth  1985-01-04    Gender  Female    Transaction Type  Outpatient Authorization    Organization  [facility]    Payer  [facility] logo     Certificate Information  Reference Number  [id number]    Status  NO ACTION REQUIRED    Message  Requested Service does not require preauthorization. We would strongly encourage you to check benefits for this service.    Member Information  Patient Name  EPHRAIM MCCALL    Patient Date of Birth  1985-01-04    Patient Gender  Female    Member ID  [id number]    Relationship to Subscriber  Self    Subscriber Name  EPHRAIM MCCALL    Requesting Provider     Name  DOMENICA SANTIAGO    NPI  [id number]    Tax Id  [id number]    Specialty  [id number]    Provider Role  Provider    Address  [address] [place] [address]    Phone  [phone]    Fax  [phone]    Contact Name  MIGDALIA BARNETT    Service Information  Service Type  2 - Surgical    Place of Service  22 - [facility]    Service From - To Date  2024-11-20 - 2024-12-31    Level of Service  Elective    Diagnosis Code 1   - Calculus of gallbladder w chronic cholecyst w/o obstruction    Procedure Code 1 (CPT/HCPCS)  13488 - LAPARO CHOLECYSTECTOMY/GRAPH    Quantity  1 Units    Status  NO ACTION REQUIRED

## 2024-11-20 ENCOUNTER — HOSPITAL ENCOUNTER (OUTPATIENT)
Facility: HOSPITAL | Age: 39
Setting detail: HOSPITAL OUTPATIENT SURGERY
Discharge: HOME | End: 2024-11-20
Attending: STUDENT IN AN ORGANIZED HEALTH CARE EDUCATION/TRAINING PROGRAM | Admitting: STUDENT IN AN ORGANIZED HEALTH CARE EDUCATION/TRAINING PROGRAM
Payer: COMMERCIAL

## 2024-11-20 ENCOUNTER — ANESTHESIA (OUTPATIENT)
Dept: SURGERY | Facility: HOSPITAL | Age: 39
End: 2024-11-20
Payer: COMMERCIAL

## 2024-11-20 ENCOUNTER — PATIENT MESSAGE (OUTPATIENT)
Facility: LOCATION | Age: 39
End: 2024-11-20

## 2024-11-20 ENCOUNTER — ANESTHESIA EVENT (OUTPATIENT)
Dept: SURGERY | Facility: HOSPITAL | Age: 39
End: 2024-11-20
Payer: COMMERCIAL

## 2024-11-20 VITALS
TEMPERATURE: 99 F | WEIGHT: 203 LBS | BODY MASS INDEX: 31.86 KG/M2 | HEIGHT: 67 IN | RESPIRATION RATE: 16 BRPM | HEART RATE: 87 BPM | DIASTOLIC BLOOD PRESSURE: 79 MMHG | SYSTOLIC BLOOD PRESSURE: 123 MMHG | OXYGEN SATURATION: 97 %

## 2024-11-20 DIAGNOSIS — K80.10 CALCULUS OF GALLBLADDER WITH CHOLECYSTITIS WITHOUT BILIARY OBSTRUCTION, UNSPECIFIED CHOLECYSTITIS ACUITY: ICD-10-CM

## 2024-11-20 LAB — B-HCG UR QL: NEGATIVE

## 2024-11-20 PROCEDURE — 0FT44ZZ RESECTION OF GALLBLADDER, PERCUTANEOUS ENDOSCOPIC APPROACH: ICD-10-PCS | Performed by: STUDENT IN AN ORGANIZED HEALTH CARE EDUCATION/TRAINING PROGRAM

## 2024-11-20 PROCEDURE — 47562 LAPAROSCOPIC CHOLECYSTECTOMY: CPT | Performed by: STUDENT IN AN ORGANIZED HEALTH CARE EDUCATION/TRAINING PROGRAM

## 2024-11-20 PROCEDURE — 47562 LAPAROSCOPIC CHOLECYSTECTOMY: CPT

## 2024-11-20 PROCEDURE — 8E0W4CZ ROBOTIC ASSISTED PROCEDURE OF TRUNK REGION, PERCUTANEOUS ENDOSCOPIC APPROACH: ICD-10-PCS | Performed by: STUDENT IN AN ORGANIZED HEALTH CARE EDUCATION/TRAINING PROGRAM

## 2024-11-20 PROCEDURE — S2900 ROBOTIC SURGICAL SYSTEM: HCPCS | Performed by: STUDENT IN AN ORGANIZED HEALTH CARE EDUCATION/TRAINING PROGRAM

## 2024-11-20 RX ORDER — HYDROMORPHONE HYDROCHLORIDE 1 MG/ML
0.6 INJECTION, SOLUTION INTRAMUSCULAR; INTRAVENOUS; SUBCUTANEOUS EVERY 5 MIN PRN
Status: DISCONTINUED | OUTPATIENT
Start: 2024-11-20 | End: 2024-11-20

## 2024-11-20 RX ORDER — HEPARIN SODIUM 5000 [USP'U]/ML
INJECTION, SOLUTION INTRAVENOUS; SUBCUTANEOUS
Status: DISCONTINUED
Start: 2024-11-20 | End: 2024-11-20

## 2024-11-20 RX ORDER — DEXAMETHASONE SODIUM PHOSPHATE 4 MG/ML
VIAL (ML) INJECTION AS NEEDED
Status: DISCONTINUED | OUTPATIENT
Start: 2024-11-20 | End: 2024-11-20 | Stop reason: SURG

## 2024-11-20 RX ORDER — PROCHLORPERAZINE EDISYLATE 5 MG/ML
5 INJECTION INTRAMUSCULAR; INTRAVENOUS EVERY 8 HOURS PRN
Status: DISCONTINUED | OUTPATIENT
Start: 2024-11-20 | End: 2024-11-20

## 2024-11-20 RX ORDER — HYDROMORPHONE HYDROCHLORIDE 1 MG/ML
0.4 INJECTION, SOLUTION INTRAMUSCULAR; INTRAVENOUS; SUBCUTANEOUS EVERY 5 MIN PRN
Status: DISCONTINUED | OUTPATIENT
Start: 2024-11-20 | End: 2024-11-20

## 2024-11-20 RX ORDER — HYDROMORPHONE HYDROCHLORIDE 1 MG/ML
INJECTION, SOLUTION INTRAMUSCULAR; INTRAVENOUS; SUBCUTANEOUS
Status: COMPLETED
Start: 2024-11-20 | End: 2024-11-20

## 2024-11-20 RX ORDER — NALOXONE HYDROCHLORIDE 0.4 MG/ML
0.08 INJECTION, SOLUTION INTRAMUSCULAR; INTRAVENOUS; SUBCUTANEOUS AS NEEDED
Status: DISCONTINUED | OUTPATIENT
Start: 2024-11-20 | End: 2024-11-20

## 2024-11-20 RX ORDER — HYDROCODONE BITARTRATE AND ACETAMINOPHEN 5; 325 MG/1; MG/1
2 TABLET ORAL ONCE AS NEEDED
Status: COMPLETED | OUTPATIENT
Start: 2024-11-20 | End: 2024-11-20

## 2024-11-20 RX ORDER — ONDANSETRON 2 MG/ML
INJECTION INTRAMUSCULAR; INTRAVENOUS AS NEEDED
Status: DISCONTINUED | OUTPATIENT
Start: 2024-11-20 | End: 2024-11-20 | Stop reason: SURG

## 2024-11-20 RX ORDER — OXYCODONE HYDROCHLORIDE 5 MG/1
5 TABLET ORAL EVERY 6 HOURS PRN
Qty: 15 TABLET | Refills: 0 | Status: SHIPPED | OUTPATIENT
Start: 2024-11-20

## 2024-11-20 RX ORDER — ACETAMINOPHEN 500 MG
1000 TABLET ORAL ONCE AS NEEDED
Status: COMPLETED | OUTPATIENT
Start: 2024-11-20 | End: 2024-11-20

## 2024-11-20 RX ORDER — GLYCOPYRROLATE 0.2 MG/ML
INJECTION, SOLUTION INTRAMUSCULAR; INTRAVENOUS AS NEEDED
Status: DISCONTINUED | OUTPATIENT
Start: 2024-11-20 | End: 2024-11-20 | Stop reason: SURG

## 2024-11-20 RX ORDER — SODIUM CHLORIDE, SODIUM LACTATE, POTASSIUM CHLORIDE, CALCIUM CHLORIDE 600; 310; 30; 20 MG/100ML; MG/100ML; MG/100ML; MG/100ML
INJECTION, SOLUTION INTRAVENOUS CONTINUOUS
Status: DISCONTINUED | OUTPATIENT
Start: 2024-11-20 | End: 2024-11-20

## 2024-11-20 RX ORDER — SCOLOPAMINE TRANSDERMAL SYSTEM 1 MG/1
PATCH, EXTENDED RELEASE TRANSDERMAL
Status: DISCONTINUED
Start: 2024-11-20 | End: 2024-11-20

## 2024-11-20 RX ORDER — LABETALOL HYDROCHLORIDE 5 MG/ML
5 INJECTION, SOLUTION INTRAVENOUS EVERY 5 MIN PRN
Status: DISCONTINUED | OUTPATIENT
Start: 2024-11-20 | End: 2024-11-20

## 2024-11-20 RX ORDER — BUPIVACAINE HYDROCHLORIDE AND EPINEPHRINE 5; 5 MG/ML; UG/ML
INJECTION, SOLUTION EPIDURAL; INTRACAUDAL; PERINEURAL AS NEEDED
Status: DISCONTINUED | OUTPATIENT
Start: 2024-11-20 | End: 2024-11-20 | Stop reason: HOSPADM

## 2024-11-20 RX ORDER — MIDAZOLAM HYDROCHLORIDE 1 MG/ML
INJECTION INTRAMUSCULAR; INTRAVENOUS AS NEEDED
Status: DISCONTINUED | OUTPATIENT
Start: 2024-11-20 | End: 2024-11-20 | Stop reason: SURG

## 2024-11-20 RX ORDER — LIDOCAINE HYDROCHLORIDE 10 MG/ML
INJECTION, SOLUTION EPIDURAL; INFILTRATION; INTRACAUDAL; PERINEURAL AS NEEDED
Status: DISCONTINUED | OUTPATIENT
Start: 2024-11-20 | End: 2024-11-20 | Stop reason: SURG

## 2024-11-20 RX ORDER — MIDAZOLAM HYDROCHLORIDE 1 MG/ML
1 INJECTION INTRAMUSCULAR; INTRAVENOUS EVERY 5 MIN PRN
Status: DISCONTINUED | OUTPATIENT
Start: 2024-11-20 | End: 2024-11-20

## 2024-11-20 RX ORDER — ACETAMINOPHEN 500 MG
1000 TABLET ORAL ONCE
Status: DISCONTINUED | OUTPATIENT
Start: 2024-11-20 | End: 2024-11-20 | Stop reason: HOSPADM

## 2024-11-20 RX ORDER — KETOROLAC TROMETHAMINE 30 MG/ML
INJECTION, SOLUTION INTRAMUSCULAR; INTRAVENOUS AS NEEDED
Status: DISCONTINUED | OUTPATIENT
Start: 2024-11-20 | End: 2024-11-20 | Stop reason: SURG

## 2024-11-20 RX ORDER — HYDROMORPHONE HYDROCHLORIDE 1 MG/ML
0.2 INJECTION, SOLUTION INTRAMUSCULAR; INTRAVENOUS; SUBCUTANEOUS EVERY 5 MIN PRN
Status: DISCONTINUED | OUTPATIENT
Start: 2024-11-20 | End: 2024-11-20

## 2024-11-20 RX ORDER — ONDANSETRON 2 MG/ML
4 INJECTION INTRAMUSCULAR; INTRAVENOUS EVERY 6 HOURS PRN
Status: DISCONTINUED | OUTPATIENT
Start: 2024-11-20 | End: 2024-11-20

## 2024-11-20 RX ORDER — INDOCYANINE GREEN AND WATER 25 MG
5 KIT INJECTION ONCE
Status: COMPLETED | OUTPATIENT
Start: 2024-11-20 | End: 2024-11-20

## 2024-11-20 RX ORDER — HEPARIN SODIUM 5000 [USP'U]/ML
5000 INJECTION, SOLUTION INTRAVENOUS; SUBCUTANEOUS ONCE
Status: COMPLETED | OUTPATIENT
Start: 2024-11-20 | End: 2024-11-20

## 2024-11-20 RX ORDER — ROCURONIUM BROMIDE 10 MG/ML
INJECTION, SOLUTION INTRAVENOUS AS NEEDED
Status: DISCONTINUED | OUTPATIENT
Start: 2024-11-20 | End: 2024-11-20 | Stop reason: SURG

## 2024-11-20 RX ORDER — SCOLOPAMINE TRANSDERMAL SYSTEM 1 MG/1
1 PATCH, EXTENDED RELEASE TRANSDERMAL ONCE
Status: DISCONTINUED | OUTPATIENT
Start: 2024-11-20 | End: 2024-11-20 | Stop reason: HOSPADM

## 2024-11-20 RX ORDER — HYDROCODONE BITARTRATE AND ACETAMINOPHEN 5; 325 MG/1; MG/1
1 TABLET ORAL ONCE AS NEEDED
Status: COMPLETED | OUTPATIENT
Start: 2024-11-20 | End: 2024-11-20

## 2024-11-20 RX ORDER — NEOSTIGMINE METHYLSULFATE 1 MG/ML
INJECTION INTRAVENOUS AS NEEDED
Status: DISCONTINUED | OUTPATIENT
Start: 2024-11-20 | End: 2024-11-20 | Stop reason: SURG

## 2024-11-20 RX ORDER — MEPERIDINE HYDROCHLORIDE 25 MG/ML
12.5 INJECTION INTRAMUSCULAR; INTRAVENOUS; SUBCUTANEOUS AS NEEDED
Status: DISCONTINUED | OUTPATIENT
Start: 2024-11-20 | End: 2024-11-20

## 2024-11-20 RX ADMIN — SODIUM CHLORIDE, SODIUM LACTATE, POTASSIUM CHLORIDE, CALCIUM CHLORIDE: 600; 310; 30; 20 INJECTION, SOLUTION INTRAVENOUS at 09:26:00

## 2024-11-20 RX ADMIN — DEXAMETHASONE SODIUM PHOSPHATE 8 MG: 4 MG/ML VIAL (ML) INJECTION at 09:37:00

## 2024-11-20 RX ADMIN — LIDOCAINE HYDROCHLORIDE 30 MG: 10 INJECTION, SOLUTION EPIDURAL; INFILTRATION; INTRACAUDAL; PERINEURAL at 09:31:00

## 2024-11-20 RX ADMIN — GLYCOPYRROLATE 0.6 MG: 0.2 INJECTION, SOLUTION INTRAMUSCULAR; INTRAVENOUS at 10:14:00

## 2024-11-20 RX ADMIN — KETOROLAC TROMETHAMINE 30 MG: 30 INJECTION, SOLUTION INTRAMUSCULAR; INTRAVENOUS at 10:12:00

## 2024-11-20 RX ADMIN — ONDANSETRON 4 MG: 2 INJECTION INTRAMUSCULAR; INTRAVENOUS at 10:03:00

## 2024-11-20 RX ADMIN — ROCURONIUM BROMIDE 40 MG: 10 INJECTION, SOLUTION INTRAVENOUS at 09:31:00

## 2024-11-20 RX ADMIN — NEOSTIGMINE METHYLSULFATE 4 MG: 1 INJECTION INTRAVENOUS at 10:14:00

## 2024-11-20 RX ADMIN — MIDAZOLAM HYDROCHLORIDE 2 MG: 1 INJECTION INTRAMUSCULAR; INTRAVENOUS at 09:26:00

## 2024-11-20 RX ADMIN — SODIUM CHLORIDE, SODIUM LACTATE, POTASSIUM CHLORIDE, CALCIUM CHLORIDE: 600; 310; 30; 20 INJECTION, SOLUTION INTRAVENOUS at 10:38:00

## 2024-11-20 NOTE — ANESTHESIA PROCEDURE NOTES
Airway  Date/Time: 11/20/2024 9:32 AM  Urgency: elective    Airway not difficult    General Information and Staff    Patient location during procedure: OR  Anesthesiologist: Jacek Fish MD  Performed: anesthesiologist   Performed by: Jacek Fish MD  Authorized by: Jacek Fish MD      Indications and Patient Condition  Indications for airway management: anesthesia  Sedation level: deep  Preoxygenated: yes  Patient position: sniffing  Mask difficulty assessment: 1 - vent by mask    Final Airway Details  Final airway type: endotracheal airway      Successful airway: ETT  Cuffed: yes   Successful intubation technique: direct laryngoscopy  Endotracheal tube insertion site: oral  Blade: Iglesia  Blade size: #3  ETT size (mm): 7.5    Cormack-Lehane Classification: grade I - full view of glottis  Placement verified by: capnometry   Measured from: teeth  ETT to teeth (cm): 20  Number of attempts at approach: 1

## 2024-11-20 NOTE — INTERVAL H&P NOTE
Pre-op Diagnosis: Calculus of gallbladder with cholecystitis without biliary obstruction, unspecified cholecystitis acuity [K80.10]    The above referenced H&P was reviewed by Jennifer Umana MD on 11/20/2024, the patient was examined and no significant changes have occurred in the patient's condition since the H&P was performed.  I discussed with the patient and/or legal representative the potential benefits, risks and side effects of this procedure; the likelihood of the patient achieving goals; and potential problems that might occur during recuperation.  I discussed reasonable alternatives to the procedure, including risks, benefits and side effects related to the alternatives and risks related to not receiving this procedure.  We will proceed with procedure as planned.

## 2024-11-20 NOTE — ANESTHESIA PREPROCEDURE EVALUATION
PRE-OP EVALUATION    Patient Name: Mary Thacker    Admit Diagnosis: Calculus of gallbladder with cholecystitis without biliary obstruction, unspecified cholecystitis acuity [K80.10]    Pre-op Diagnosis: Calculus of gallbladder with cholecystitis without biliary obstruction, unspecified cholecystitis acuity [K80.10]    ROBOTIC LAPAROSCOPIC CHOLECYSTECTOMY POSSIBLE OPEN    Anesthesia Procedure: ROBOTIC LAPAROSCOPIC CHOLECYSTECTOMY POSSIBLE OPEN    Surgeons and Role:     * Jennifer Umana MD - Primary    Pre-op vitals reviewed.        Body mass index is 32.26 kg/m².    Current medications reviewed.  Hospital Medications:   scopolamine (Transderm-Scop) 1 MG/3DAYS patch        heparin (Porcine) 5000 UNIT/ML injection        ceFAZolin (Ancef) 2 g/10mL IV syringe premix        indocyanine green (IC-Green) injection 5 mg  5 mg Intravenous Once       Outpatient Medications:   Prescriptions Prior to Admission[1]    Allergies: Amoxicillin, Levofloxacin, Penicillins, Zithromax, Dander, Dust, Dust mite extract, and Cat hair extract      Anesthesia Evaluation    Patient summary reviewed.    Anesthetic Complications  (-) history of anesthetic complications         GI/Hepatic/Renal                                 Cardiovascular        Exercise tolerance: good     MET: >4                                           Endo/Other                                  Pulmonary                           Neuro/Psych        (+) anxiety                      Patient Active Problem List:     Other allergy, other than to medicinal agents     Radial styloid tenosynovitis    Cholelithiasis        Past Surgical History:   Procedure Laterality Date    Other surgical history  01/01/2005    corneal lasik     Social History     Socioeconomic History    Marital status:    Tobacco Use    Smoking status: Never     Passive exposure: Never    Smokeless tobacco: Never   Vaping Use    Vaping status: Never Used   Substance and Sexual Activity    Alcohol  use: Yes     Alcohol/week: 2.0 standard drinks of alcohol     Types: 2 Glasses of wine per week     Comment: occ    Drug use: No   Other Topics Concern    Caffeine Concern Yes     Comment: 1.5 cups of coffee daily    Exercise No    Seat Belt Yes     History   Drug Use No     Available pre-op labs reviewed.               Airway      Mallampati: II  Mouth opening: >3 FB  TM distance: 4 - 6 cm  Neck ROM: full Cardiovascular      Rhythm: regular  Rate: normal     Dental    Dentition appears grossly intact         Pulmonary      Breath sounds clear to auscultation bilaterally.               Other findings              ASA: 2   Plan: general  NPO status verified and patient meets guidelines.    Post-procedure pain management plan discussed with surgeon and patient.      Plan/risks discussed with: patient                Present on Admission:  **None**             [1]   Medications Prior to Admission   Medication Sig Dispense Refill Last Dose/Taking    Multiple Vitamin (MULTI-VITAMIN) Oral Tab Take by mouth As Directed.   Taking    Iron Combinations (IRON COMPLEX OR) Take 1 tablet by mouth daily.   Taking    pantoprazole 40 MG Oral Tab EC Take 1 tablet (40 mg total) by mouth every morning before breakfast.   Taking    sertraline (ZOLOFT) 50 MG Oral Tab Take 1 tablet (50 mg total) by mouth daily. 90 tablet 1 Taking    Cholecalciferol (VITAMIN D) 50 MCG (2000 UT) Oral Cap Take 1 capsule (2,000 Units total) by mouth daily.   Taking    ALPRAZolam (XANAX) 0.25 MG Oral Tab Take 1 tablet (0.25 mg total) by mouth 2 (two) times daily as needed for Anxiety. (Patient taking differently: Take 1 tablet (0.25 mg total) by mouth as needed for Anxiety.) 10 tablet 0 Taking Differently

## 2024-11-20 NOTE — ANESTHESIA POSTPROCEDURE EVALUATION
[facility]    Mary Thacker Patient Status:  Hospital Outpatient Surgery   Age/Gender 39 year old female MRN [id number]   Location [facility] SURGERY Attending Jennifer Umana MD   Hosp Day # 0 PCP Yousuf Boudreaux MD       Anesthesia Post-op Note    ROBOTIC LAPAROSCOPIC CHOLECYSTECTOMY WITH INDOCYANINE GREEN    Procedure Summary       Date: 11/20/24 Room / Location:  MAIN OR 08 /  MAIN OR    Anesthesia Start: 0926 Anesthesia Stop: 1038    Procedure: ROBOTIC LAPAROSCOPIC CHOLECYSTECTOMY WITH INDOCYANINE GREEN (Abdomen) Diagnosis:       Calculus of gallbladder with cholecystitis without biliary obstruction, unspecified cholecystitis acuity      (Calculus of gallbladder with cholecystitis without biliary obstruction, unspecified cholecystitis acuity [K80.10])    Surgeons: Jennifer Umana MD Anesthesiologist: Jacek Fish MD    Anesthesia Type: general ASA Status: 2            Anesthesia Type: general    Vitals Value Taken Time   /63 11/20/24 1039   Temp 99.2 11/20/24 1039   Pulse 88 11/20/24 1039   Resp 16 11/20/24 1039   SpO2 96 11/20/24 1039       Patient Location: PACU    Anesthesia Type: general    Airway Patency: extubated    Postop Pain Control: adequate    Mental Status: mildly sedated but able to meaningfully participate in the post-anesthesia evaluation    Nausea/Vomiting: none    Cardiopulmonary/Hydration status: stable euvolemic    Complications: no apparent anesthesia related complications    Postop vital signs: stable    Dental Exam: Unchanged from Preop

## 2024-11-20 NOTE — OPERATIVE REPORT
[facility]  Operative Note    Mary Thacker Location: OR   [facility] [id number] MRN [id number]    1985 Age 39 year old   Admission Date 2024 Operation Date 2024   Attending Physician Jennifer Umana MD Operating Physician Jennifer Umana MD   PCP Yousuf Boudreaux MD        Patient Name: Mary Thacker    Preoperative Diagnosis: Calculus of gallbladder with cholecystitis without biliary obstruction, unspecified cholecystitis acuity [K80.10]    Postoperative Diagnosis: Same as pre-op diagnosis.    Primary Surgeon: Jennifer Umana MD     Assistant: Theresa Nguyễn PA-C    Anesthesia: General    Anesthesiologist: Anesthesiologist.: Jacek Fish MD    Procedures: Robot-assisted Laparoscopic Cholecystectomy with ICG injection     Implants: None    Specimen: Gallbladder    Drains: None    Estimated Blood Loss: Blood Output: 5 mL (2024 10:26 AM)       Complications: none    Condition: Good    Indications for Surgery:   Mary Thacker is a 39 year old female who presents with progressive epigastric and right upper quadrant abdominal pain. Work-up revealed cholelithiasis and cholecystitis. The patient presents today for laparoscopic cholecystectomy.    Surgical Findings:   Cholelithiasis and chronic cholecystitis    Description of Procedure:   The patient was transported to the operating room and placed on the operating table in supine position.General endotracheal anesthesia was administered. Preoperative antibiotics were given. The abdomen was prepped and draped in sterile fashion. A time-out was performed.    A stab incision was made in the left upper quadrant 2 cms below the costal margin at the mid clavicular line. The Veress needle was introduced into the abdominal cavity and pneumoperitoneum was achieved to a pressure of 15 mmHg.   An 8 mm periumbilical incision was made.   An 8 mm trocar was placed through this incision with a 5 mm laparoscope visualizing the abdominal wall layers during entry.   Upon initial inspection of the abdominal cavity there was no injury from our entry. There was no unexpected abnormality of the visible abdominal organs.  Three additional 8 mm trochars were placed in the mid right abdomen, right flank and left upper quadrant . Patient was placed in reverse Trendelenburg position and right side up.  The robot was docked. Instruments were placed under direct visualization.  Attention was turned to the right upper quadrant. The gallbladder dome was grasped and elevated,. The infundibulum was retracted. Indocyanine green had been injected preoperatively and firefly was used to confirm the location of the cystic duct and common bile duct. Dissection was initiated at the triangle of Calot.  After clearing the peritoneum and connective tissue the cystic duct and cystic artery were identified superior to the line of Rouviere. The infundibulum was dissected away from the liver bed. The critical view of safety was obtained.  Next, a clip was placed on the specimen side of the cystic duct and two clips were placed on the patient's side of the cystic duct. The duct was divided. Next, one clip was placed on the cystic artery on the specimen side, two towards the patient side, and the cystic artery was divided. The gallbladder was elevated from the gallbladder fossa using electrocautery. Once the gallbladder was dissected from the gallbladder fossa it was placed in an endocatch specimen bag and set aside.   Hemostasis on the gallbladder fossa was achieved with electrocautery. The right upper quadrant was then irrigated until the effluent fluid was clear.  The previously placed clips on the cystic duct and cystic artery were visualized and inspected; they were well approximated, well situated, and there was no evidence of active bleeding or bile leak. The specimen was then extracted from the umbilical trocar site.  Pneumoperitoneum was released and trocars removed. The fascia at the umbilicus was  reapproximated using #1 PDS suture. All skin incisions were cleansed, irrigated, and injected with local anesthetic. Skin incisions were reapproximated using subcuticular 4-0 monocryl suture.  Skin glue was used to seal all the incisions.  The patient was awakened from anesthesia and brought to the recovery room in good condition. The patient tolerated the procedure well without apparent complication. All sponge, needle, and instrument counts were correct at the end of the case.  Jennifer Umana MD   11/20/2024  10:33 AM

## 2024-11-20 NOTE — DISCHARGE INSTRUCTIONS
Home Care Instructions  Cholecystectomy      WHAT TO EXPECT  You may feel pain at the incisions. This is due to stitches placed during the surgery.    You may feel pain in the shoulders. This is due to irritation of the diaphragm by the air used to inflate the abdomen.    You may feel a sore throat. This is due to the breathing tube used during surgery.     You may feel mild nausea and vomiting for the first 24 hours, this should resolve quickly.    You may have constipation, especially if taking narcotic pain medications. If you have not had a bowel movement by 48 hours after surgery, take Miralax 17g (one cap full) every 12 hours until you have a bowel movement. If another 24 hours goes by without a bowel movement, then take a dose of magnesium citrate or milk of magnesia.     MEDICATIONS  Take 2 Extra Strength Tylenol (1000mg every) 8 hours for pain. For the first 3 days it is best to take the Tylenol every 8 hours even if you do not feel much pain.     For moderate to severe pain take one Oxycodone pill (5mg) or Norco (325/5mg) every six hours as needed for pain. If you do not feel that narcotics are necessary you shouldn’t take them. If the pain is severe you can take two pills (10mg) every six hours.    You can take Advil (ibuprofen) 800mg every 8 hours or Alleve (naproxen) 500mg every 12 hours.     Please ask your surgeon before resuming blood thinners such as Aspirin, Plavix, Coumadin, Warfarin, Eliquis, or Xarelto. All other home medications may be resumed as scheduled.    Norco contains acetaminophen which is the active ingredient in Tylenol. Do not exceed the recommended dose of 4000mg of Acetaminophen within 24 hours from all sources (norco and tylenol).    We recommend you do not take benzodiazepine (xanax) and narcotic medication (oxycodone) together.     DIET  Start with a light and bland diet and slowly advance to regular food as your appetite improves. There are no specific food restrictions. Do not  eat excessively. Eat small frequent meals.     Drink plenty of water. Try to eat a healthy high fiber diet.    Do not drink alcohol (beer, wine, liquor) or use tobacco products.    WOUND CARE  You can shower 24 hours after surgery and get the dressings wet.    The skin glue will stay on for 10 to 14 days after surgery.     Soap and water can get on the incisions but do not scrub the wounds. No hair dye or chemicals of any kind should get on the incisions.     Do not apply any topical ointments such as Neosporin or Hydrogen Peroxide.    Do not swim or submerge the incisions under water for 1 month.    ACTIVITY  Every day you should be up walking around the house. Do not lie in bed all day. Staying active prevents blood clots and pneumonia.    You can go up and down stairs. Do not lift more than 20 pounds or perform strenuous activity that requires straining the core muscles.    You may ride in a car but should not drive the car for at least one week.     APPOINTMENT  Please call our office at [phone] soon to make an appointment.    For questions or concerns please call our office between 8:30 a.m. and 5 p.m. Monday through Friday. The number above directs to the answering service after hours to reach the on-call physician.    Please call our office immediately for fever greater than 100.5, excess bleeding, inability to urinate, severe abdominal pain, severe diarrhea, uncontrollable vomiting.      For life threatening emergencies such as severe chest pain, difficulty breathing, or loss of conciousness call [phone].       You received a drug called Toradol which is an Anti Inflammatory at: 10:15 am   If you are allowed to take Anti inflammatories:    Do not take any Anti Inflammatory like Motrin, Aleve or Ibuprophen until after: 4:15 pm   Please report any suspected allergic reactions or bleeding issues to your doctor

## 2024-11-21 NOTE — TELEPHONE ENCOUNTER
BRADEN Parker responds:  We can discuss with patient further at her post-op visit. Acid reflux is different than biliary colic symptoms. Some people do report resolution of their GERD-like symptoms following surgery, indicating that it was likely the gallbladder causing their symptoms and not acid reflux; though it is definitely possible to have both problems and still require their PPI.

## 2024-12-09 ENCOUNTER — OFFICE VISIT (OUTPATIENT)
Facility: LOCATION | Age: 39
End: 2024-12-09
Payer: COMMERCIAL

## 2024-12-09 VITALS
HEART RATE: 84 BPM | WEIGHT: 203 LBS | OXYGEN SATURATION: 100 % | TEMPERATURE: 99 F | SYSTOLIC BLOOD PRESSURE: 123 MMHG | DIASTOLIC BLOOD PRESSURE: 79 MMHG | BODY MASS INDEX: 31.86 KG/M2 | HEIGHT: 67 IN

## 2024-12-09 DIAGNOSIS — Z98.890 POSTOPERATIVE STATE: Primary | ICD-10-CM

## 2024-12-09 DIAGNOSIS — Z90.49 STATUS POST LAPAROSCOPIC CHOLECYSTECTOMY: ICD-10-CM

## 2024-12-09 NOTE — PROGRESS NOTES
Post Operative Visit Note       Active Problems  1. Postoperative state    2. Status post laparoscopic cholecystectomy         Chief Complaint   Chief Complaint   Patient presents with    Post-Op     PO -W/ FRANCO ON ,  ROBOTIC LAPAROSCOPIC CHOLECYSTECTOMY WITH INDOCYANINE GREEN, pt thinks she has a hernia right above her belly button, diarrhea, no other symptoms.            History of Present Illness   The patient presents for continued care and evaluation following a robotic cholecystectomy with Dr. Umana on 2024.     Overall, the patient is doing well postoperatively.  She denies significant abdominal pain.  She is no longer requiring narcotic pain medication.  The patient states she is concerned because she feels a small lump at the umbilicus and is concerned for a hernia at this incision site.    She is tolerating a diet. She is avoiding fatty foods. She denies nausea or vomiting. She states her GERD symptoms have resolved since surgery. She has not been taking her pantoprazole.     Some urgency with bowel movements. She reports some looser stools.     The patient does not require a return to work note.     Allergies  Mary is allergic to amoxicillin, levofloxacin, penicillins, zithromax, dander, dust, dust mite extract, and cat hair extract.    Past Medical / Surgical / Social / Family History    The past medical and past surgical history have been reviewed by me today.     Past Medical History:    Anxiety state    Infertility, female    Obesity    Pain with bowel movements    Visual impairment    glasses     Past Surgical History:   Procedure Laterality Date    Cholecystectomy  24      2018    Other surgical history  2005    corneal lasik       The family history and social history have been reviewed by me today.    Family History   Problem Relation Age of Onset    Hypertension Father     Colon Polyps Father     Diabetes Father     Polyps Father     Diabetes Mother     Heart  Attack Maternal Grandmother     Diabetes Maternal Grandfather         mellitus    Breast Cancer Maternal Aunt     Breast Cancer Paternal Aunt      Social History     Socioeconomic History    Marital status:    Tobacco Use    Smoking status: Never     Passive exposure: Never    Smokeless tobacco: Never   Vaping Use    Vaping status: Never Used   Substance and Sexual Activity    Alcohol use: Yes     Alcohol/week: 1.0 standard drink of alcohol     Types: 1 Glasses of wine per week     Comment: this is an average per week--it's less many weeks    Drug use: Never   Other Topics Concern    Caffeine Concern Yes     Comment: I have 1 cup per day    Stress Concern Yes     Comment: Taking sertaline    Weight Concern Yes    Special Diet Yes     Comment: Not sure what to eat,  but trying to figure out what to eat    Exercise Yes     Comment: Walks several times per week    Seat Belt Yes        Current Outpatient Medications:     Multiple Vitamin (MULTI-VITAMIN) Oral Tab, Take by mouth As Directed., Disp: , Rfl:     Iron Combinations (IRON COMPLEX OR), Take 1 tablet by mouth daily., Disp: , Rfl:     sertraline (ZOLOFT) 50 MG Oral Tab, Take 1 tablet (50 mg total) by mouth daily., Disp: 90 tablet, Rfl: 1    Cholecalciferol (VITAMIN D) 50 MCG (2000 UT) Oral Cap, Take 1 capsule (2,000 Units total) by mouth daily., Disp: , Rfl:     ALPRAZolam (XANAX) 0.25 MG Oral Tab, Take 1 tablet (0.25 mg total) by mouth 2 (two) times daily as needed for Anxiety. (Patient taking differently: Take 1 tablet (0.25 mg total) by mouth as needed for Anxiety.), Disp: 10 tablet, Rfl: 0      Review of Systems  The Review of Systems has been reviewed by me during today.  Review of Systems    Physical Findings   /79 (BP Location: Left arm, Patient Position: Sitting, Cuff Size: adult)   Pulse 84   Temp 98.5 °F (36.9 °C) (Temporal)   Ht 67\"   Wt 203 lb (92.1 kg)   LMP 11/25/2024 (Approximate)   SpO2 100%   BMI 31.79 kg/m²   Physical  Exam  Vitals and nursing note reviewed.   Constitutional:       General: She is not in acute distress.     Appearance: Normal appearance.   HENT:      Head: Normocephalic and atraumatic.      Right Ear: External ear normal.      Left Ear: External ear normal.      Nose: Nose normal.   Eyes:      General: No scleral icterus.     Conjunctiva/sclera: Conjunctivae normal.   Abdominal:      General: Abdomen is flat. There is no distension.      Palpations: Abdomen is soft. There is no mass.      Tenderness: There is no abdominal tenderness.      Hernia: No hernia is present.      Comments: Clinical exam of the abdomen reveals it to be soft, nondistended, nontender to palpation.  Laparoscopic incision sites are clean, dry, intact without surrounding erythema or cellulitis.  No incisional site hernia with Valsalva maneuver.  Expected scar tissue at umbilical incision site.     Musculoskeletal:      Cervical back: Normal range of motion and neck supple.   Neurological:      Mental Status: She is alert.   Psychiatric:         Mood and Affect: Mood normal.         Behavior: Behavior normal.         Thought Content: Thought content normal.             Assessment   1. Postoperative state    2. Status post laparoscopic cholecystectomy          Plan   The patient is doing well status post robotic cholecystectomy.    I took the opportunity at this appointment to discuss the pathology with the patient which revealed chronic cholecystitis, with cholesterolosis and cholelithiasis.      I discussed with the patient that they should refrain from any bending, pushing, pulling, twisting, or lifting of a force greater than 15-20 pounds for 6 weeks post-op. Activity restrictions were reviewed. Driving restrictions were reviewed.     The patient may shower. They should avoid scrubbing their incisions, but may allow soap and water to wash over the incisions. The patient should avoid submerging their incisions in a bath, hot tub, pool for a  total of 2 weeks postoperatively.    The patient should continue a general diet. I recommend metamucil powder for urgency with bowel movements and looser stools.    I explained to the patient that her bowel movements should become more regular within 4 to 6 weeks postop.  If she continues to have symptoms, then she may present back to our office for follow-up.    The patient may take ibuprofen and Tylenol as needed for pain management.  They may also utilize heating pads or ice packs for comfort measures.    I explained to the patient that she does not have evidence of an incisional site hernia.    All of the patient's questions were answered.  The patient verbalized understanding and agreement with the plan of care.    I have no further follow-up scheduled with this patient at this time.  This patient can see a Physician Assistant or Dr. Umana on an as-needed basis.  This patient should return urgently for any problems or complications related to the surgical intervention.         No orders of the defined types were placed in this encounter.      Imaging & Referrals   None    Follow Up  No follow-ups on file.    Faye Bruce PA-C

## 2025-04-04 ENCOUNTER — TELEMEDICINE (OUTPATIENT)
Dept: INTERNAL MEDICINE CLINIC | Facility: CLINIC | Age: 40
End: 2025-04-04
Payer: COMMERCIAL

## 2025-04-04 DIAGNOSIS — F41.9 ANXIETY AND DEPRESSION: ICD-10-CM

## 2025-04-04 DIAGNOSIS — F32.A ANXIETY AND DEPRESSION: ICD-10-CM

## 2025-04-04 NOTE — TELEPHONE ENCOUNTER
Patient requesting a refill as she was on for her VV but was unable to wait any longer due to having to leave for work    Patient made appt for 4/7/2025 with SM     Medication sent

## 2025-04-07 ENCOUNTER — OFFICE VISIT (OUTPATIENT)
Dept: INTERNAL MEDICINE CLINIC | Facility: CLINIC | Age: 40
End: 2025-04-07
Payer: COMMERCIAL

## 2025-04-07 ENCOUNTER — LAB ENCOUNTER (OUTPATIENT)
Dept: LAB | Age: 40
End: 2025-04-07
Attending: FAMILY MEDICINE
Payer: COMMERCIAL

## 2025-04-07 VITALS
HEIGHT: 67 IN | HEART RATE: 86 BPM | OXYGEN SATURATION: 99 % | DIASTOLIC BLOOD PRESSURE: 76 MMHG | RESPIRATION RATE: 16 BRPM | SYSTOLIC BLOOD PRESSURE: 120 MMHG | TEMPERATURE: 97 F | BODY MASS INDEX: 31.39 KG/M2 | WEIGHT: 200 LBS

## 2025-04-07 DIAGNOSIS — R11.0 NAUSEA: ICD-10-CM

## 2025-04-07 DIAGNOSIS — R14.0 ABDOMINAL BLOATING: ICD-10-CM

## 2025-04-07 DIAGNOSIS — R19.7 DIARRHEA, UNSPECIFIED TYPE: ICD-10-CM

## 2025-04-07 DIAGNOSIS — R19.7 DIARRHEA, UNSPECIFIED TYPE: Primary | ICD-10-CM

## 2025-04-07 PROCEDURE — 87177 OVA AND PARASITES SMEARS: CPT

## 2025-04-07 PROCEDURE — 87427 SHIGA-LIKE TOXIN AG IA: CPT

## 2025-04-07 PROCEDURE — 87045 FECES CULTURE AEROBIC BACT: CPT

## 2025-04-07 PROCEDURE — 87046 STOOL CULTR AEROBIC BACT EA: CPT

## 2025-04-07 PROCEDURE — 87209 SMEAR COMPLEX STAIN: CPT

## 2025-04-07 PROCEDURE — 87015 SPECIMEN INFECT AGNT CONCNTJ: CPT

## 2025-04-07 PROCEDURE — 87493 C DIFF AMPLIFIED PROBE: CPT

## 2025-04-07 PROCEDURE — 99213 OFFICE O/P EST LOW 20 MIN: CPT | Performed by: FAMILY MEDICINE

## 2025-04-07 PROCEDURE — 87338 HPYLORI STOOL AG IA: CPT

## 2025-04-07 PROCEDURE — 3074F SYST BP LT 130 MM HG: CPT | Performed by: FAMILY MEDICINE

## 2025-04-07 PROCEDURE — 3078F DIAST BP <80 MM HG: CPT | Performed by: FAMILY MEDICINE

## 2025-04-07 PROCEDURE — 3008F BODY MASS INDEX DOCD: CPT | Performed by: FAMILY MEDICINE

## 2025-04-07 NOTE — PROGRESS NOTES
CHIEF COMPLAINT:     Chief Complaint   Patient presents with    Nausea     Gallbladder removed 11/2024, has major gas after eating, loose BM, nausea. Last week had diarrhea and still having diarrhea. States has been watching diet. Using digestive enzymes daily       HPI:   Mary Thacker is a 40 year old female c/o nausea, gas, and bloating especially after PO intake x 2-3 weeks.  Patient had gallbladder removed 11/2024. Also been feeling very tired. Symptoms got worse last week when she had 12 episodes of diarrhea overnight. Then symptoms started to slowly improve but she is still having diarrhea regularly. Stools and belching are foul smelling. Started digestive enzymes on Friday per friend's recommendation. Has not been taking metamucil. Still eating small portions, avoiding greasy, fatty, spicy foods. Denies any abdominal pain or vomiting, denies urinary symptoms. Denies fevers or chills.     Current Outpatient Medications   Medication Sig Dispense Refill    Amylase-Lipase-Protease (DIGESTIVE ENZYMES OR) Take 1 capsule by mouth daily.      sertraline (ZOLOFT) 50 MG Oral Tab Take 1 tablet (50 mg total) by mouth daily. 90 tablet 0    Multiple Vitamin (MULTI-VITAMIN) Oral Tab Take by mouth As Directed.      Iron Combinations (IRON COMPLEX OR) Take 1 tablet by mouth daily.      Cholecalciferol (VITAMIN D) 50 MCG (2000 UT) Oral Cap Take 1 capsule (2,000 Units total) by mouth daily.      ALPRAZolam (XANAX) 0.25 MG Oral Tab Take 1 tablet (0.25 mg total) by mouth 2 (two) times daily as needed for Anxiety. (Patient not taking: Reported on 4/7/2025) 10 tablet 0      Past Medical History:    Anxiety state    Infertility, female    Obesity    Pain with bowel movements    Visual impairment    glasses      Social History:  Social History     Socioeconomic History    Marital status:    Tobacco Use    Smoking status: Never     Passive exposure: Never    Smokeless tobacco: Never   Vaping Use    Vaping status: Never Used    Substance and Sexual Activity    Alcohol use: Yes     Alcohol/week: 1.0 standard drink of alcohol     Types: 1 Glasses of wine per week     Comment: this is an average per week--it's less many weeks    Drug use: Never   Other Topics Concern    Caffeine Concern Yes     Comment: I have 1 cup per day    Stress Concern Yes     Comment: Taking sertaline    Weight Concern Yes    Special Diet Yes     Comment: Not sure what to eat,  but trying to figure out what to eat    Exercise Yes     Comment: Walks several times per week    Seat Belt Yes     Social Drivers of Health      Received from [place], [place]    [facility]        REVIEW OF SYSTEMS:   GENERAL: Denies fever, chills,weight change, decreased appetite  CHEST: Denies chest pain, or palpitations  LUNGS: Denies shortness of breath, cough, or wheezing  GI: see HPI  : denies any urinary frequency, urgency, or dysuria.       EXAM:   /76 (BP Location: Left arm, Patient Position: Sitting, Cuff Size: adult)   Pulse 86   Temp 97.4 °F (36.3 °C) (Temporal)   Resp 16   Ht 5' 7\" (1.702 m)   Wt 200 lb (90.7 kg)   LMP 11/25/2024 (Approximate)   SpO2 99%   BMI 31.32 kg/m²   GENERAL: well developed, well nourished,in no apparent distress  LUNGS: clear to auscultation bilaterally, no wheezes or rhonchi. Breathing is non labored.  CARDIO: RRR without murmur  GI: No visible scars, or masses. Hypoactive BS's present x4. No palpable masses or hepatosplenomegaly.  No tenderness on palpation.    Physical Exam    ASSESSMENT AND PLAN:     Encounter Diagnoses   Name Primary?    Diarrhea, unspecified type Yes    Nausea     Abdominal bloating        Orders Placed This Encounter   Procedures    H. Pylori Stool Ag, EIA [E]    C. diff toxigenic PCR (OPT) [E]    Stool Culture w/Shigatoxin [E]    Ova and Parasite       Meds & Refills for this Visit:  Requested Prescriptions      No prescriptions requested or ordered in this encounter       Imaging &  Consults:  None    PLAN:    1. Diarrhea, unspecified type  - continue bland diet, avoiding greasy, spicy, or fatty foods  - recommended metamucil or fiber supplement to bulk stool and help with fat digestion   - try OTC omeprazole for symptoms  - will check stool for infectious etiologies   - Amylase-Lipase-Protease (DIGESTIVE ENZYMES OR); Take 1 capsule by mouth daily.  - C. diff toxigenic PCR (OPT) [E]; Future  - H. Pylori Stool Ag, EIA [E]; Future  - Stool Culture w/Shigatoxin [E]; Future  - Ova and Parasite; Future    2. Nausea  - see above  - H. Pylori Stool Ag, EIA [E]; Future    3. Abdominal bloating  - see above  - Amylase-Lipase-Protease (DIGESTIVE ENZYMES OR); Take 1 capsule by mouth daily.  - C. diff toxigenic PCR (OPT) [E]; Future  - H. Pylori Stool Ag, EIA [E]; Future  - Stool Culture w/Shigatoxin [E]; Future  - Ova and Parasite; Future    The patient indicates understanding of these issues and agrees to the plan.  The patient is asked to return PRN.

## 2025-04-10 LAB — H PYLORI AG STL QL IA: NEGATIVE

## 2025-07-07 NOTE — PROGRESS NOTES
HPI:   Mary Thacker is a 40 year old female who presents for a complete physical exam. Pt sees gyne for her gyne exams. Symptoms: denies discharge, itching, burning or dysuria, periods are regular, flow is 4-5 days. Patient complains of none.   Regular SBE- no,Sexually active- no,  Contraception- none. STD history- none    Pt is here for a recheck of anxiety. Has been tolerating the med-Zoloft well. Denies any side effects from the meds. Mood has been good. Patient's appetite is good.Pt denies headaches,tics,or insomnia.No depressive symptoms or suicidal ideations. Would like to continue the same medication. Pt needs a refill.    Screening:  Immunization History   Administered Date(s) Administered    Covid-19 Vaccine Pfizer 30 mcg/0.3 ml 03/22/2021, 04/12/2021, 11/23/2021    DTAP 02/26/1985, 05/21/1985, 07/16/1985, 07/17/1986, 01/22/1990    FLUZONE 6 months and older PFS 0.5 ml (48255) 10/04/2018, 10/31/2020    Fluarix 6 Months And Older 0.5 ml prefilled syringe (43256) 10/04/2018    Flublok Quad Influenza Vaccine (46775) 10/17/2021, 11/11/2022    Flucelvax 0.5 Ml Quad PFS Single Dose 11/01/2019    HEP B 07/10/1995, 08/10/1995, 12/17/1999    Influenza 10/13/2016, 11/03/2019, 10/14/2023, 10/23/2024    MMR 04/08/1986, 09/10/1999    OPV 02/26/1985, 05/21/1985, 07/17/1986, 01/22/1990    TD 02/17/1999    TDAP 11/14/2018, 10/03/2021    Tb Intradermal Test 07/31/2012      Menarche- 11 yr  PAP- 4/29/24  Any abnormal pap smears- none  Pregnancies- 1, Live births- 1  Mammogram-  DUE   Any breast cancer- maternal great aunt, maternal aunt, or any gynecological cancer- none, any cancers- self -skin melanoma   Labs- partial 10/22/24  DEXA over 65yr- n/a  Flu shot-  fall 2024  Colon- n/a  Glasses/contacts- yes, last exam- 4/2025  Dental visits- has an appt in August    Immunization History   Administered Date(s) Administered    Covid-19 Vaccine Pfizer 30 mcg/0.3 ml 03/22/2021, 04/12/2021, 11/23/2021    DTAP 02/26/1985, 05/21/1985,  07/16/1985, 07/17/1986, 01/22/1990    FLUZONE 6 months and older PFS 0.5 ml (95396) 10/04/2018, 10/31/2020    Fluarix 6 Months And Older 0.5 ml prefilled syringe (81220) 10/04/2018    Flublok Quad Influenza Vaccine (98699) 10/17/2021, 11/11/2022    Flucelvax 0.5 Ml Quad PFS Single Dose 11/01/2019    HEP B 07/10/1995, 08/10/1995, 12/17/1999    Influenza 10/13/2016, 11/03/2019, 10/14/2023, 10/23/2024    MMR 04/08/1986, 09/10/1999    OPV 02/26/1985, 05/21/1985, 07/17/1986, 01/22/1990    TD 02/17/1999    TDAP 11/14/2018, 10/03/2021    Tb Intradermal Test 07/31/2012     Wt Readings from Last 6 Encounters:   07/08/25 206 lb 3.2 oz (93.5 kg)   04/07/25 200 lb (90.7 kg)   12/09/24 203 lb (92.1 kg)   11/20/24 203 lb (92.1 kg)   04/29/24 202 lb 13.2 oz (92 kg)   04/09/24 201 lb 3.2 oz (91.3 kg)     Body mass index is 32.3 kg/m².     Lab Results   Component Value Date    GLU 89 04/09/2024    GLU 90 08/03/2023    GLU 93 04/27/2021     Lab Results   Component Value Date    CHOLEST 162 04/09/2024    CHOLEST 141 08/03/2023    CHOLEST 138 04/27/2021     Lab Results   Component Value Date    HDL 35 (L) 04/09/2024    HDL 38 (L) 08/03/2023    HDL 33 (L) 04/27/2021     Lab Results   Component Value Date     (H) 04/09/2024    LDL 79 08/03/2023    LDL 72 04/27/2021     Lab Results   Component Value Date    AST 17 04/09/2024    AST 17 08/03/2023    AST 16 04/27/2021     Lab Results   Component Value Date    ALT 19 04/09/2024    ALT 26 08/03/2023    ALT 31 04/27/2021       Current Medications[1]   Past Medical History[2]   Past Surgical History[3]   Family History[4]   Social History:   Short Social Hx on File[5]  Occ:  . : no. Children: 1.   Exercise: minimal.  Diet: watches minimally     REVIEW OF SYSTEMS:   GENERAL: feels well otherwise  SKIN: Pt developed a redden rash between her breasts while camping this past weekend  EYES:denies blurred vision or double vision  HEENT: denies nasal congestion, sinus pain or  ST  LUNGS: denies shortness of breath with exertion  CARDIOVASCULAR: denies chest pain on exertion  GI: denies abdominal pain,denies heartburn  : denies dysuria, vaginal discharge or itching,periods regular   MUSCULOSKELETAL: denies back pain  NEURO: denies headaches  PSYCHE: + anxiety  HEMATOLOGIC: denies hx of anemia  ENDOCRINE: denies thyroid history  ALL/ASTHMA: + allergy     EXAM:   /74 (BP Location: Left arm, Patient Position: Sitting, Cuff Size: adult)   Pulse 78   Temp 97.9 °F (36.6 °C) (Temporal)   Resp 16   Ht 5' 7\" (1.702 m)   Wt 206 lb 3.2 oz (93.5 kg)   LMP 05/28/2025 (Approximate)   SpO2 98%   BMI 32.30 kg/m²   Body mass index is 32.3 kg/m².   GENERAL: well developed, well nourished,in no apparent distress  SKIN: redden macular rash between the breasts  HEENT: atraumatic, normocephalic,ears and throat are clear  EYES:PERRLA, EOMI, normal optic disk,conjunctiva are clear  NECK: supple,no adenopathy,no bruits  CHEST: no chest tenderness  BREAST: no dominant or suspicious mass  LUNGS: clear to auscultation  CARDIO: RRR without murmur  GI: good BS's,no masses, HSM or tenderness  : sees gyne  MUSCULOSKELETAL: back is not tender,FROM of the back  EXTREMITIES: no cyanosis, clubbing or edema  NEURO: Oriented times three,cranial nerves are intact,motor and sensory are grossly intact    ASSESSMENT AND PLAN:   Mary Thacker is a 40 year old female who presents for a complete physical exam. Order put in for mammogram.  Health maintenance, will check fasting Labs.. Pt referred for screening colonoscopy- Pt is due for a repeat colonoscopy due to her family Hx  Pt' s weight is Body mass index is 32.3 kg/m²., recommended low fat diet and aerobic exercise 30 minutes three times weekly.  The patient indicates understanding of these issues and agrees to the plan.  The patient is asked to return for CPX in one year.  1. Anxiety and depression  - stable, continue medication.  - sertraline (ZOLOFT) 50 MG  Oral Tab; Take 1 tablet (50 mg total) by mouth daily.  Dispense: 90 tablet; Refill: 1    2. Routine general medical examination at a health care facility    - CBC With Differential With Platelet; Future  - Comp Metabolic Panel (14); Future  - Lipid Panel; Future  - Hemoglobin A1C; Future  - TSH W Reflex To Free T4; Future  - Vitamin D; Future    3. Encounter for screening mammogram for malignant neoplasm of breast    - [facility] JONAS 2D+3D SCREENING BILAT (CPT=77067/73588); Future    4. Intertrigo    - let your skin dry completely after bathing. Apply the Lotrisone cream twice a day until rash is gone.  - Dress in loose cotton clothing.  - Don't scratch the affected areas. This can delay healing and may spread the infection. It can also cause a bacterial infection.  - Keep your skin clean, don't over wash the skin. This can irritate your skin.  - Also, It can take 2-4 weeks for the rash to totally clear up.      - clotrimazole-betamethasone 1-0.05 % External Cream; Apply 1 Application topically 2 (two) times daily as needed.  Dispense: 45 g; Refill: 0    Encounter Diagnoses   Name Primary?    Anxiety and depression     Routine general medical examination at a health care facility Yes    Encounter for screening mammogram for malignant neoplasm of breast     Intertrigo        Orders Placed This Encounter   Procedures    CBC With Differential With Platelet    Comp Metabolic Panel (14)    Lipid Panel    Hemoglobin A1C    TSH W Reflex To Free T4    Vitamin D       Meds & Refills for this Visit:  Requested Prescriptions     Signed Prescriptions Disp Refills    sertraline (ZOLOFT) 50 MG Oral Tab 90 tablet 1     Sig: Take 1 tablet (50 mg total) by mouth daily.    clotrimazole-betamethasone 1-0.05 % External Cream 45 g 0     Sig: Apply 1 Application topically 2 (two) times daily as needed.       Imaging & Consults:  [facility] JONAS 2D+3D SCREENING BILAT (CPT=77067/24498)           [1]   Current Outpatient Medications   Medication Sig  Dispense Refill    sertraline (ZOLOFT) 50 MG Oral Tab Take 1 tablet (50 mg total) by mouth daily. 90 tablet 1    clotrimazole-betamethasone 1-0.05 % External Cream Apply 1 Application topically 2 (two) times daily as needed. 45 g 0    Amylase-Lipase-Protease (DIGESTIVE ENZYMES OR) Take 1 capsule by mouth daily.      Iron Combinations (IRON COMPLEX OR) Take 1 tablet by mouth daily.      Cholecalciferol (VITAMIN D) 50 MCG (2000 UT) Oral Cap Take 1 capsule (2,000 Units total) by mouth daily.      Multiple Vitamin (MULTI-VITAMIN) Oral Tab Take by mouth As Directed.     [2]   Past Medical History:   Anxiety state    Infertility, female    Obesity    Pain with bowel movements    Visual impairment    glasses   [3]   Past Surgical History:  Procedure Laterality Date    Cholecystectomy  24      2018    Other surgical history  2005    corneal lasik   [4]   Family History  Problem Relation Age of Onset    Hypertension Father     Colon Polyps Father     Diabetes Father     Polyps Father     Diabetes Mother     Heart Attack Maternal Grandmother     Diabetes Maternal Grandfather         mellitus    Breast Cancer Maternal Aunt     Breast Cancer Paternal Aunt    [5]   Social History  Socioeconomic History    Marital status:    Tobacco Use    Smoking status: Never     Passive exposure: Never    Smokeless tobacco: Never   Vaping Use    Vaping status: Never Used   Substance and Sexual Activity    Alcohol use: Yes     Alcohol/week: 1.0 standard drink of alcohol     Types: 1 Glasses of wine per week     Comment: this is an average per week--it's less many weeks    Drug use: Never   Other Topics Concern    Caffeine Concern Yes     Comment: I have 1 cup per day    Stress Concern Yes     Comment: Taking sertaline    Weight Concern Yes    Special Diet Yes     Comment: Not sure what to eat,  but trying to figure out what to eat    Exercise Yes     Comment: Walks several times per week    Seat Belt Yes     Social  Drivers of Health     Food Insecurity: No Food Insecurity (7/8/2025)    NCSS - Food Insecurity     Worried About Running Out of Food in the Last Year: No     Ran Out of Food in the Last Year: No   Transportation Needs: No Transportation Needs (7/8/2025)    NCSS - Transportation     Lack of Transportation: No   Housing Stability: Not At Risk (7/8/2025)    NCSS - Housing/Utilities     Has Housing: Yes     Worried About Losing Housing: No     Unable to Get Utilities: No

## 2025-07-08 ENCOUNTER — OFFICE VISIT (OUTPATIENT)
Dept: INTERNAL MEDICINE CLINIC | Facility: CLINIC | Age: 40
End: 2025-07-08
Payer: COMMERCIAL

## 2025-07-08 ENCOUNTER — LAB ENCOUNTER (OUTPATIENT)
Dept: LAB | Age: 40
End: 2025-07-08
Attending: FAMILY MEDICINE
Payer: COMMERCIAL

## 2025-07-08 ENCOUNTER — TELEPHONE (OUTPATIENT)
Dept: INTERNAL MEDICINE CLINIC | Facility: CLINIC | Age: 40
End: 2025-07-08

## 2025-07-08 VITALS
DIASTOLIC BLOOD PRESSURE: 74 MMHG | BODY MASS INDEX: 32.36 KG/M2 | TEMPERATURE: 98 F | SYSTOLIC BLOOD PRESSURE: 120 MMHG | HEART RATE: 78 BPM | OXYGEN SATURATION: 98 % | WEIGHT: 206.19 LBS | HEIGHT: 67 IN | RESPIRATION RATE: 16 BRPM

## 2025-07-08 DIAGNOSIS — L30.4 INTERTRIGO: ICD-10-CM

## 2025-07-08 DIAGNOSIS — Z12.31 ENCOUNTER FOR SCREENING MAMMOGRAM FOR MALIGNANT NEOPLASM OF BREAST: ICD-10-CM

## 2025-07-08 DIAGNOSIS — F41.9 ANXIETY AND DEPRESSION: ICD-10-CM

## 2025-07-08 DIAGNOSIS — Z00.00 ROUTINE GENERAL MEDICAL EXAMINATION AT A HEALTH CARE FACILITY: ICD-10-CM

## 2025-07-08 DIAGNOSIS — Z00.00 ROUTINE GENERAL MEDICAL EXAMINATION AT A HEALTH CARE FACILITY: Primary | ICD-10-CM

## 2025-07-08 DIAGNOSIS — F32.A ANXIETY AND DEPRESSION: ICD-10-CM

## 2025-07-08 LAB
ALBUMIN SERPL-MCNC: 4.5 G/DL (ref 3.2–4.8)
ALBUMIN/GLOB SERPL: 1.6 {RATIO} (ref 1–2)
ALP LIVER SERPL-CCNC: 64 U/L (ref 37–98)
ALT SERPL-CCNC: 34 U/L (ref 10–49)
ANION GAP SERPL CALC-SCNC: 6 MMOL/L (ref 0–18)
AST SERPL-CCNC: 25 U/L (ref ?–34)
BASOPHILS # BLD AUTO: 0.03 X10(3) UL (ref 0–0.2)
BASOPHILS NFR BLD AUTO: 0.4 %
BILIRUB SERPL-MCNC: 0.3 MG/DL (ref 0.3–1.2)
BUN BLD-MCNC: 9 MG/DL (ref 9–23)
CALCIUM BLD-MCNC: 9.4 MG/DL (ref 8.7–10.6)
CHLORIDE SERPL-SCNC: 105 MMOL/L (ref 98–112)
CHOLEST SERPL-MCNC: 177 MG/DL (ref ?–200)
CO2 SERPL-SCNC: 30 MMOL/L (ref 21–32)
CREAT BLD-MCNC: 0.75 MG/DL (ref 0.55–1.02)
EGFRCR SERPLBLD CKD-EPI 2021: 103 ML/MIN/1.73M2 (ref 60–?)
EOSINOPHIL # BLD AUTO: 0.21 X10(3) UL (ref 0–0.7)
EOSINOPHIL NFR BLD AUTO: 2.6 %
ERYTHROCYTE [DISTWIDTH] IN BLOOD BY AUTOMATED COUNT: 12.6 %
EST. AVERAGE GLUCOSE BLD GHB EST-MCNC: 111 MG/DL (ref 68–126)
FASTING PATIENT LIPID ANSWER: YES
FASTING STATUS PATIENT QL REPORTED: YES
GLOBULIN PLAS-MCNC: 2.8 G/DL (ref 2–3.5)
GLUCOSE BLD-MCNC: 101 MG/DL (ref 70–99)
HBA1C MFR BLD: 5.5 % (ref ?–5.7)
HCT VFR BLD AUTO: 41.9 % (ref 35–48)
HDLC SERPL-MCNC: 41 MG/DL (ref 40–59)
HGB BLD-MCNC: 13.4 G/DL (ref 12–16)
IMM GRANULOCYTES # BLD AUTO: 0.02 X10(3) UL (ref 0–1)
IMM GRANULOCYTES NFR BLD: 0.3 %
LDLC SERPL CALC-MCNC: 106 MG/DL (ref ?–100)
LYMPHOCYTES # BLD AUTO: 1.69 X10(3) UL (ref 1–4)
LYMPHOCYTES NFR BLD AUTO: 21.2 %
MCH RBC QN AUTO: 28.5 PG (ref 26–34)
MCHC RBC AUTO-ENTMCNC: 32 G/DL (ref 31–37)
MCV RBC AUTO: 89.1 FL (ref 80–100)
MONOCYTES # BLD AUTO: 0.67 X10(3) UL (ref 0.1–1)
MONOCYTES NFR BLD AUTO: 8.4 %
NEUTROPHILS # BLD AUTO: 5.35 X10 (3) UL (ref 1.5–7.7)
NEUTROPHILS # BLD AUTO: 5.35 X10(3) UL (ref 1.5–7.7)
NEUTROPHILS NFR BLD AUTO: 67.1 %
NONHDLC SERPL-MCNC: 136 MG/DL (ref ?–130)
OSMOLALITY SERPL CALC.SUM OF ELEC: 291 MOSM/KG (ref 275–295)
PLATELET # BLD AUTO: 298 10(3)UL (ref 150–450)
POTASSIUM SERPL-SCNC: 4.6 MMOL/L (ref 3.5–5.1)
PROT SERPL-MCNC: 7.3 G/DL (ref 5.7–8.2)
RBC # BLD AUTO: 4.7 X10(6)UL (ref 3.8–5.3)
SODIUM SERPL-SCNC: 141 MMOL/L (ref 136–145)
TRIGL SERPL-MCNC: 172 MG/DL (ref 30–149)
TSI SER-ACNC: 1.15 UIU/ML (ref 0.55–4.78)
VIT D+METAB SERPL-MCNC: 49.6 NG/ML (ref 30–100)
VLDLC SERPL CALC-MCNC: 29 MG/DL (ref 0–30)
WBC # BLD AUTO: 8 X10(3) UL (ref 4–11)

## 2025-07-08 PROCEDURE — 3008F BODY MASS INDEX DOCD: CPT | Performed by: FAMILY MEDICINE

## 2025-07-08 PROCEDURE — 36415 COLL VENOUS BLD VENIPUNCTURE: CPT

## 2025-07-08 PROCEDURE — 80061 LIPID PANEL: CPT

## 2025-07-08 PROCEDURE — 3074F SYST BP LT 130 MM HG: CPT | Performed by: FAMILY MEDICINE

## 2025-07-08 PROCEDURE — 99396 PREV VISIT EST AGE 40-64: CPT | Performed by: FAMILY MEDICINE

## 2025-07-08 PROCEDURE — 80053 COMPREHEN METABOLIC PANEL: CPT

## 2025-07-08 PROCEDURE — 85025 COMPLETE CBC W/AUTO DIFF WBC: CPT

## 2025-07-08 PROCEDURE — 3078F DIAST BP <80 MM HG: CPT | Performed by: FAMILY MEDICINE

## 2025-07-08 PROCEDURE — 99212 OFFICE O/P EST SF 10 MIN: CPT | Performed by: FAMILY MEDICINE

## 2025-07-08 PROCEDURE — 83036 HEMOGLOBIN GLYCOSYLATED A1C: CPT

## 2025-07-08 PROCEDURE — 82306 VITAMIN D 25 HYDROXY: CPT

## 2025-07-08 PROCEDURE — 84443 ASSAY THYROID STIM HORMONE: CPT

## 2025-07-08 RX ORDER — CLOTRIMAZOLE AND BETAMETHASONE DIPROPIONATE 10; .64 MG/G; MG/G
1 CREAM TOPICAL 2 TIMES DAILY PRN
Qty: 45 G | Refills: 0 | Status: SHIPPED | OUTPATIENT
Start: 2025-07-08

## 2025-07-11 NOTE — TELEPHONE ENCOUNTER
[facility]CM sent to pt with colonoscopy info.   Per report, repeat colonoscopy at age 40 due to family h/o of colonic polyps.     [facility] info listed in [facility]  
Noted.  
NEGATIVE

## 2025-07-31 ENCOUNTER — HOSPITAL ENCOUNTER (OUTPATIENT)
Dept: MAMMOGRAPHY | Age: 40
Discharge: HOME | End: 2025-07-31
Attending: FAMILY MEDICINE

## 2025-07-31 DIAGNOSIS — Z12.31 ENCOUNTER FOR SCREENING MAMMOGRAM FOR MALIGNANT NEOPLASM OF BREAST: ICD-10-CM

## 2025-07-31 PROCEDURE — 77067 SCR MAMMO BI INCL CAD: CPT | Performed by: FAMILY MEDICINE

## 2025-07-31 PROCEDURE — 77063 BREAST TOMOSYNTHESIS BI: CPT | Performed by: FAMILY MEDICINE

## (undated) DEVICE — SYRINGE MED 10ML LL TIP W/O SFTY DISP

## (undated) DEVICE — ARM DRAPE

## (undated) DEVICE — SEAL

## (undated) DEVICE — LAPAROVUE VISIBILITY SYSTEM LAPAROSCOPIC SOLUTIONS: Brand: LAPAROVUE

## (undated) DEVICE — Device: Brand: SUTURE PASSOR PRO

## (undated) DEVICE — FENESTRATED BIPOLAR FORCEPS: Brand: ENDOWRIST

## (undated) DEVICE — STANDARD HYPODERMIC NEEDLE,POLYPROPYLENE HUB: Brand: MONOJECT

## (undated) DEVICE — DISPOSABLE GRASPER: Brand: EPIX LAPAROSCOPIC GRASPER

## (undated) DEVICE — SUT MCRYL 4-0 18IN PS-2 ABSRB UD 19MM 3/8 CIR

## (undated) DEVICE — GLOVE SUR 7.5 SENSICARE PI PIP GRN PWD F

## (undated) DEVICE — ANCHOR TISSUE RETRIEVAL SYSTEM, BAG SIZE 125 ML, PORT SIZE 8 MM: Brand: ANCHOR TISSUE RETRIEVAL SYSTEM

## (undated) DEVICE — SUT PDS II 1 36IN ABSRB VLT L36MM CT-1

## (undated) DEVICE — ENDOPATH ULTRA VERESS INSUFFLATION NEEDLES WITH LUER LOCK CONNECTORS: Brand: ENDOPATH

## (undated) DEVICE — PROGRASP FORCEPS: Brand: ENDOWRIST

## (undated) DEVICE — BINDER ABD SM M W9IN FOR 30-45IN 3 PNL E

## (undated) DEVICE — GLOVE SUR 7 SENSICARE PI PIP CRM PWD F

## (undated) DEVICE — ADHESIVE SKIN TOP FOR WND CLSR DERMBND ADV

## (undated) DEVICE — LARGE HEM-O-LOK CLIP APPLIER: Brand: ENDOWRIST

## (undated) DEVICE — BLADELESS OBTURATOR: Brand: WECK VISTA

## (undated) DEVICE — COVER,LIGHT,CAMERA,HARD,1/PK,STRL: Brand: MEDLINE

## (undated) DEVICE — COLUMN DRAPE

## (undated) DEVICE — ROBOTIC GENERAL: Brand: MEDLINE INDUSTRIES, INC.

## (undated) DEVICE — 40580 - THE PINK PAD - ADVANCED TRENDELENBURG POSITIONING KIT: Brand: 40580 - THE PINK PAD - ADVANCED TRENDELENBURG POSITIONING KIT

## (undated) DEVICE — DRAPE,TOP,102X53,STERILE: Brand: MEDLINE

## (undated) DEVICE — CLIP INT L POLYMER LOK LIG HEM O LOK

## (undated) DEVICE — PERMANENT CAUTERY HOOK: Brand: ENDOWRIST

## (undated) DEVICE — GLOVE,SURG,SENSICARE SLT,LF,PF,7: Brand: MEDLINE

## (undated) NOTE — LETTER
Date & Time: 2/16/2023, 1:02 PM  Patient: Leona Pinto  Encounter Provider(s):    Justo Hoyt MD       To Whom It May Concern:    Wandy Myrick was seen and treated in our department on 2/16/2023.  She Will need to be off work for 2 days while she recovers from illness  If you have any questions or concerns, please do not hesitate to call.        _____________________________  Physician/APC Signature

## (undated) NOTE — LETTER
[address]  [address]  Authorization for Surgical Operation and Procedure     Date:___________                                                                                                         Time:__________  I hereby authorize Surgeon(s):  Jennifer Umana MD, my physician and his/her assistants (if applicable), which may include medical students, residents, and/or fellows, to perform the following surgical operation/ procedure and administer such anesthesia as may be determined necessary by my physician:  Operation/Procedure name (s) Procedure(s):  ROBOTIC LAPAROSCOPIC CHOLECYSTECTOMY WITH INDOCYANINE GREEN, POSSIBLE OPEN on Mary Thacker   2.   I recognize that during the surgical operation/procedure, unforeseen conditions may necessitate additional or different procedures than those listed above.  I, therefore, further authorize and request that the above-named surgeon, assistants, or designees perform such procedures as are, in their judgment, necessary and desirable.    3.   My surgeon/physician has discussed prior to my surgery the potential benefits, risks and side effects of this procedure; the likelihood of achieving goals; and potential problems that might occur during recuperation.  They also discussed reasonable alternatives to the procedure, including risks, benefits, and side effects related to the alternatives and risks related to not receiving this procedure.  I have had all my questions answered and I acknowledge that no guarantee has been made as to the result that may be obtained.    4.   Should the need arise during my operation/procedure, which includes change of level of care prior to discharge, I also consent to the administration of blood and/or blood products.  Further, I understand that despite careful testing and screening of blood or blood products by collecting agencies, I may still be subject to ill effects as a result of receiving a blood  transfusion and/or blood products.  The following are some, but not all, of the potential risks that can occur: fever and allergic reactions, hemolytic reactions, transmission of diseases such as Hepatitis, AIDS and Cytomegalovirus (CMV) and fluid overload.  In the event that I wish to have an autologous transfusion of my own blood, or a directed donor transfusion, I will discuss this with my physician.  Check only if Refusing Blood or Blood Products  I understand refusal of blood or blood products as deemed necessary by my physician may have serious consequences to my condition to include possible death. I hereby assume responsibility for my refusal and release the hospital, its personnel, and my physicians from any responsibility for the consequences of my refusal.          o  Refuse      5.   I authorize the use of any specimen, organs, tissues, body parts or foreign objects that may be removed from my body during the operation/procedure for diagnosis, research or teaching purposes and their subsequent disposal by hospital authorities.  I also authorize the release of specimen test results and/or written reports to my treating physician on the hospital medical staff or other referring or consulting physicians involved in my care, at the discretion of the Pathologist or my treating physician.    6.   I consent to the photographing or videotaping of the operations or procedures to be performed, including appropriate portions of my body for medical, scientific, or educational purposes, provided my identity is not revealed by the pictures or by descriptive texts accompanying them.  If the procedure has been photographed/videotaped, the surgeon will obtain the original picture, image, videotape or CD.  The hospital will not be responsible for storage, release or maintenance of the picture, image, tape or CD.    7.   I consent to the presence of a  or observers in the operating room as deemed  necessary by my physician or their designees.    8.   I recognize that in the event my procedure results in extended X-Ray/fluoroscopy time, I may develop a skin reaction.    9. If I have a Do Not Attempt Resuscitation (DNAR) order in place, that status will be suspended while in the operating room, procedural suite, and during the recovery period unless otherwise explicitly stated by me (or a person authorized to consent on my behalf). The surgeon or my attending physician will determine when the applicable recovery period ends for purposes of reinstating the DNAR order.  10. Patients having a sterilization procedure: I understand that if the procedure is successful the results will be permanent and it will therefore be impossible for me to inseminate, conceive, or bear children.  I also understand that the procedure is intended to result in sterility, although the result has not been guaranteed.   11. I acknowledge that my physician has explained sedation/analgesia administration to me including the risk and benefits I consent to the administration of sedation/analgesia as may be necessary or desirable in the judgment of my physician.    I CERTIFY THAT I HAVE READ AND FULLY UNDERSTAND THE ABOVE CONSENT TO OPERATION and/or OTHER PROCEDURE.    _________________________________________  __________________________________  Signature of Patient     Signature of Responsible Person         ___________________________________         Printed Name of Responsible Person           _________________________________                 Relationship to Patient  _________________________________________  ______________________________  Signature of Witness          Date  Time      Patient Name: Mary DAWSON Kedar     : 1985                 Printed: 2024     Medical Record #: [id number]                     Page 1 of 2                                    [address]  [address]    Consent  for Anesthesia    I, Mary DAWSON Emiliadonna agree to be cared for by an anesthesiologist, who is specially trained to monitor me and give me medicine to put me to sleep or keep me comfortable during my procedure    I understand that my anesthesiologist is not an employee or agent of [facility] or "[employer]. He or she works for [employer].    As the patient asking for anesthesia services, I agree to:  Allow the anesthesiologist (anesthesia doctor) to give me medicine and do additional procedures as necessary. Some examples are: Starting or using an “IV” to give me medicine, fluids or blood during my procedure, and having a breathing tube placed to help me breathe when I’m asleep (intubation). In the event that my heart stops working properly, I understand that my anesthesiologist will make every effort to sustain my life, unless otherwise directed by [facility] Do Not Resuscitate documents.  Tell my anesthesia doctor before my procedure:  If I am pregnant.  The last time that I ate or drank.  All of the medicines I take (including prescriptions, herbal supplements, and pills I can buy without a prescription (including street drugs/illegal medications). Failure to inform my anesthesiologist about these medicines may increase my risk of anesthetic complications.  If I am allergic to anything or have had a reaction to anesthesia before.  I understand how the anesthesia medicine will help me (benefits).  I understand that with any type of anesthesia medicine there are risks:  The most common risks are: nausea, vomiting, sore throat, muscle soreness, damage to my eyes, mouth, or teeth (from breathing tube placement).  Rare risks include: remembering what happened during my procedure, allergic reactions to medications, injury to my airway, heart, lungs, vision, nerves, or muscles and in extremely rare instances death.  My doctor has explained to me other choices available to me for my care  (alternatives).  Pregnant Patients (“epidural”):  I understand that the risks of having an epidural (medicine given into my back to help control pain during labor), include itching, low blood pressure, difficulty urinating, headache or slowing of the baby’s heart. Very rare risks include infection, bleeding, seizure, irregular heart rhythms and nerve injury.  Regional Anesthesia (“spinal”, “epidural”, & “nerve blocks”):  I understand that rare but potential complications include headache, bleeding, infection, seizure, irregular heart rhythms, and nerve injury.    I can change my mind about having anesthesia services at any time before I get the medicine.    _____________________________________________________________________________  Patient (or Representative) Signature/Relationship to Patient  Date   Time    _____________________________________________________________________________   Name (if used)    Language/Organization   Time    _____________________________________________________________________________  Anesthesiologist Signature     Date   Time  I have discussed the procedure and information above with the patient (or patient’s representative) and answered their questions. The patient or their representative has agreed to have anesthesia services.    _____________________________________________________________________________  Witness        Date   Time  I have verified that the signature is that of the patient or patient’s representative, and that it was signed before the procedure  Patient Name: Mary Thacker     : 1985                 Printed: 2024     Medical Record #: [id number]                     Page 2 of 2

## (undated) NOTE — LETTER
[address] [place] [id number]  Dept: [phone]  Dept Fax: [phone]      January 13, 2018    Patient: Kathy Rodriguez   Date of Visit: 1/13/2018       To Whom It May Concern:    Jillian Dyer was seen and treat

## (undated) NOTE — LETTER
Date: 7/25/2022    Patient Name: Michi Meraz          To Whom it may concern: The above patient was seen at the [facility] for treatment of a medical condition- Otitis Media and Toe pain. This patient should be excused from attending work/school from 7/25/22. The patient may return to work/school on 7/26/22 with the following limitations: Pt to be able to wear sandals to work due to toe pain.         Sincerely,          JIMMIE García

## (undated) NOTE — LETTER
12/02/21        Mary [address]      Dear Domenica Coppola,    Our records indicate that you have outstanding lab work and or testing that was ordered for you and has not yet been completed:  Orders Placed This Encounter          Vit

## (undated) NOTE — LETTER
02/24/20        Mary [address]      Dear Toby Castrejon,    Our records indicate that you have outstanding lab work and or testing that was ordered for you and has not yet been completed:  Orders Placed This Encounter      PATRICK LORENZO